# Patient Record
Sex: MALE | Race: BLACK OR AFRICAN AMERICAN | NOT HISPANIC OR LATINO | Employment: FULL TIME | URBAN - METROPOLITAN AREA
[De-identification: names, ages, dates, MRNs, and addresses within clinical notes are randomized per-mention and may not be internally consistent; named-entity substitution may affect disease eponyms.]

---

## 2023-07-10 NOTE — PROGRESS NOTES
"Subjective:       German Schmid is a 48 y.o. male who is a new patient who was  referred by Dr Froilan Dutta   for evaluation of "prostate check".      Referred for prostate cancer screening. PSA not sent with referral. No outside records available. He states he had a blood test recently but unsure if PSA was included. Denies nephrolithiasis, hematuria. H/o UTI 2-3 years ago. Denies LUTS. Denies family history of prostate cancer.     The following portions of the patient's history were reviewed and updated as appropriate: allergies, current medications, past family history, past medical history, past social history, past surgical history and problem list.    Review of Systems  Twelve point review of systems completed. Pertinent positive and negatives listed in HPI.      Objective:    Vitals: /80 (BP Location: Right arm, Patient Position: Sitting, BP Method: Large (Automatic))   Pulse 71   Ht 6' (1.829 m)   Wt 92.7 kg (204 lb 4.1 oz)   SpO2 97%   BMI 27.70 kg/m²     Physical Exam   General: well developed, well nourished in no acute distress  Head: normocephalic, atraumatic  Neck: supple, trachea midline, no obvious enlargement of thyroid  HEENT: EOMI, mucus membranes moist, sclera anicteric, no hearing impairment  Lungs: symmetric expansion, non-labored breathing  Neuro: alert and oriented x 3, no gross deficits  Psych: normal judgment and insight, normal mood/affect and non-anxious  Genitourinary:   Penis -  circumcised penis without plaques, lesions, or scarring.  Urethra - orthotopic location without stenosis.  Scrotum  - no lesions or rashes, no hydrocele or hernia.  Testes - descended bilaterally, symmetric without masses, non tender.  Epididymides - no cysts or lesions, no spermatocele, symmetric.   ALYSSA: Symmetric 40g prostate without nodularity or tenderness. Normal landmarks. seminal vesicles non palpable, normal sphincter tone without hemorrhoids or rectal masses. Normal appearance of anus and " perineum.      Lab Review   Urine analysis today in clinic shows +protein trace    No results found for: WBC, HGB, HCT, MCV, PLT  No results found for: CREATININE, BUN  No results found for: PSA  No results found for: PSADIAG    Imaging  NA     Assessment/Plan:      1. Prostate cancer screening    - He would like to proceed with PCa screening.    - PSA today   - ALYSSA normal   - Recommend annual checks if PSA normal       Follow up in 12 months with PSA

## 2023-07-19 ENCOUNTER — TELEPHONE (OUTPATIENT)
Dept: UROLOGY | Facility: CLINIC | Age: 48
End: 2023-07-19
Payer: COMMERCIAL

## 2023-07-19 ENCOUNTER — OFFICE VISIT (OUTPATIENT)
Dept: UROLOGY | Facility: CLINIC | Age: 48
End: 2023-07-19
Attending: UROLOGY
Payer: COMMERCIAL

## 2023-07-19 VITALS
DIASTOLIC BLOOD PRESSURE: 80 MMHG | SYSTOLIC BLOOD PRESSURE: 127 MMHG | OXYGEN SATURATION: 97 % | HEIGHT: 72 IN | WEIGHT: 204.25 LBS | BODY MASS INDEX: 27.67 KG/M2 | HEART RATE: 71 BPM

## 2023-07-19 DIAGNOSIS — Z12.5 PROSTATE CANCER SCREENING: Primary | ICD-10-CM

## 2023-07-19 PROCEDURE — 1159F MED LIST DOCD IN RCRD: CPT | Mod: CPTII,S$GLB,, | Performed by: UROLOGY

## 2023-07-19 PROCEDURE — 1160F PR REVIEW ALL MEDS BY PRESCRIBER/CLIN PHARMACIST DOCUMENTED: ICD-10-PCS | Mod: CPTII,S$GLB,, | Performed by: UROLOGY

## 2023-07-19 PROCEDURE — 3079F PR MOST RECENT DIASTOLIC BLOOD PRESSURE 80-89 MM HG: ICD-10-PCS | Mod: CPTII,S$GLB,, | Performed by: UROLOGY

## 2023-07-19 PROCEDURE — 3008F BODY MASS INDEX DOCD: CPT | Mod: CPTII,S$GLB,, | Performed by: UROLOGY

## 2023-07-19 PROCEDURE — 99202 OFFICE O/P NEW SF 15 MIN: CPT | Mod: S$GLB,,, | Performed by: UROLOGY

## 2023-07-19 PROCEDURE — 1159F PR MEDICATION LIST DOCUMENTED IN MEDICAL RECORD: ICD-10-PCS | Mod: CPTII,S$GLB,, | Performed by: UROLOGY

## 2023-07-19 PROCEDURE — 1160F RVW MEDS BY RX/DR IN RCRD: CPT | Mod: CPTII,S$GLB,, | Performed by: UROLOGY

## 2023-07-19 PROCEDURE — 3074F PR MOST RECENT SYSTOLIC BLOOD PRESSURE < 130 MM HG: ICD-10-PCS | Mod: CPTII,S$GLB,, | Performed by: UROLOGY

## 2023-07-19 PROCEDURE — 3074F SYST BP LT 130 MM HG: CPT | Mod: CPTII,S$GLB,, | Performed by: UROLOGY

## 2023-07-19 PROCEDURE — 3079F DIAST BP 80-89 MM HG: CPT | Mod: CPTII,S$GLB,, | Performed by: UROLOGY

## 2023-07-19 PROCEDURE — 3008F PR BODY MASS INDEX (BMI) DOCUMENTED: ICD-10-PCS | Mod: CPTII,S$GLB,, | Performed by: UROLOGY

## 2023-07-19 PROCEDURE — 99202 PR OFFICE/OUTPT VISIT, NEW, LEVL II, 15-29 MIN: ICD-10-PCS | Mod: S$GLB,,, | Performed by: UROLOGY

## 2023-07-19 NOTE — TELEPHONE ENCOUNTER
Pt notified of PSA results      ----- Message from Gardenia Noel MD sent at 7/19/2023  1:57 PM CDT -----  Please inform pt:  PSA looks good - 0.41. Recommend recheck in 1 year as planned.

## 2023-08-14 ENCOUNTER — TELEPHONE (OUTPATIENT)
Dept: TRANSPLANT | Facility: CLINIC | Age: 48
End: 2023-08-14
Payer: COMMERCIAL

## 2023-08-14 DIAGNOSIS — Z00.5 WILLING TO BE AN ORGAN DONOR: Primary | ICD-10-CM

## 2023-08-22 ENCOUNTER — LAB VISIT (OUTPATIENT)
Dept: LAB | Facility: HOSPITAL | Age: 48
End: 2023-08-22
Attending: NURSE PRACTITIONER
Payer: COMMERCIAL

## 2023-08-22 DIAGNOSIS — Z00.5 WILLING TO BE AN ORGAN DONOR: ICD-10-CM

## 2023-08-22 LAB — ABO + RH BLD: NORMAL

## 2023-08-22 PROCEDURE — 81373 HLA I TYPING 1 LOCUS LR: CPT | Mod: PN,TXP | Performed by: NURSE PRACTITIONER

## 2023-08-22 PROCEDURE — 81376 HLA II TYPING 1 LOCUS LR: CPT | Mod: 59,PN,TXP | Performed by: NURSE PRACTITIONER

## 2023-08-22 PROCEDURE — 81373 HLA I TYPING 1 LOCUS LR: CPT | Mod: 59,PN,TXP | Performed by: NURSE PRACTITIONER

## 2023-08-22 PROCEDURE — 81376 HLA II TYPING 1 LOCUS LR: CPT | Mod: PN,TXP | Performed by: NURSE PRACTITIONER

## 2023-08-22 PROCEDURE — 86901 BLOOD TYPING SEROLOGIC RH(D): CPT | Mod: TXP | Performed by: NURSE PRACTITIONER

## 2023-08-22 PROCEDURE — 36415 COLL VENOUS BLD VENIPUNCTURE: CPT | Mod: PO,TXP | Performed by: NURSE PRACTITIONER

## 2023-08-30 ENCOUNTER — TELEPHONE (OUTPATIENT)
Dept: TRANSPLANT | Facility: CLINIC | Age: 48
End: 2023-08-30
Payer: COMMERCIAL

## 2023-08-30 DIAGNOSIS — Z00.5 TRANSPLANT DONOR EVALUATION: Primary | ICD-10-CM

## 2023-08-30 NOTE — TELEPHONE ENCOUNTER
Patient notified that the results of the crossmatch with his friend show that they are compatible. Patient states he would like to proceed with the medical evaluation. Required testing was discussed including infectious disease and HIV testing. Opportunity provided for questions. Informed that he will be contacted to schedule appointments. All questions were answered and patient verbalized understanding.

## 2023-09-15 LAB — HLATY INTERPRETATION: NORMAL

## 2023-09-27 LAB
HLA DQA1 1: NORMAL
HLA DQA1 2: NORMAL
HLA DRB4 1: NORMAL
HLA-A 1 SERO. EQUIV: 3
HLA-A 1: NORMAL
HLA-A 2 SERO. EQUIV: 24
HLA-A 2: NORMAL
HLA-B 1 SERO. EQUIV: 7
HLA-B 1: NORMAL
HLA-B 2 SERO. EQUIV: NORMAL
HLA-B 2: NORMAL
HLA-BW 1 SERO. EQUIV: 6
HLA-BW 2 SERO. EQUIV: NORMAL
HLA-C 1: NORMAL
HLA-C 2: NORMAL
HLA-CW 1 SERO. EQUIV: 7
HLA-CW 2 SERO. EQUIV: NORMAL
HLA-DPA1 1: NORMAL
HLA-DPA1 2: NORMAL
HLA-DPB1 1: NORMAL
HLA-DPB1 2: NORMAL
HLA-DQ 1 SERO. EQUIV: 7
HLA-DQ 2 SERO. EQUIV: 6
HLA-DQB1 1: NORMAL
HLA-DQB1 2: NORMAL
HLA-DRB1 1 SERO. EQUIV: 11
HLA-DRB1 1: NORMAL
HLA-DRB1 2 SERO. EQUIV: 15
HLA-DRB1 2: NORMAL
HLA-DRB3 1: NORMAL
HLA-DRB3 2: NORMAL
HLA-DRB345 1 SERO. EQUIV: 52
HLA-DRB345 2 SERO. EQUIV: 51
HLA-DRB4 2: NORMAL
HLA-DRB5 1: NORMAL
HLA-DRB5 2: NORMAL
RTPCR TESTING DATE: NORMAL

## 2023-10-19 ENCOUNTER — TELEPHONE (OUTPATIENT)
Dept: TRANSPLANT | Facility: CLINIC | Age: 48
End: 2023-10-19
Payer: COMMERCIAL

## 2023-10-19 NOTE — TELEPHONE ENCOUNTER
Spoke with donor, confirmed appointments for 10/24/23.  24 hour urine collection instructions reviewed.  All questions answered.

## 2023-10-24 ENCOUNTER — HOSPITAL ENCOUNTER (OUTPATIENT)
Dept: RADIOLOGY | Facility: HOSPITAL | Age: 48
Discharge: HOME | End: 2023-10-24
Attending: TRANSPLANT SURGERY
Payer: COMMERCIAL

## 2023-10-24 ENCOUNTER — HOSPITAL ENCOUNTER (OUTPATIENT)
Dept: CARDIOLOGY | Facility: HOSPITAL | Age: 48
Discharge: HOME | End: 2023-10-24
Attending: NURSE PRACTITIONER
Payer: COMMERCIAL

## 2023-10-24 ENCOUNTER — OFFICE VISIT (OUTPATIENT)
Dept: TRANSPLANT | Facility: CLINIC | Age: 48
End: 2023-10-24
Payer: COMMERCIAL

## 2023-10-24 ENCOUNTER — OFFICE VISIT (OUTPATIENT)
Dept: PSYCHIATRY | Facility: CLINIC | Age: 48
End: 2023-10-24
Payer: COMMERCIAL

## 2023-10-24 ENCOUNTER — HOSPITAL ENCOUNTER (OUTPATIENT)
Dept: RADIOLOGY | Facility: HOSPITAL | Age: 48
Discharge: HOME | End: 2023-10-24
Attending: NURSE PRACTITIONER
Payer: COMMERCIAL

## 2023-10-24 VITALS — BODY MASS INDEX: 28.56 KG/M2 | HEIGHT: 71 IN | WEIGHT: 204 LBS

## 2023-10-24 VITALS
DIASTOLIC BLOOD PRESSURE: 84 MMHG | WEIGHT: 197.56 LBS | HEART RATE: 57 BPM | SYSTOLIC BLOOD PRESSURE: 133 MMHG | OXYGEN SATURATION: 99 % | HEIGHT: 71 IN | RESPIRATION RATE: 16 BRPM | TEMPERATURE: 97 F | BODY MASS INDEX: 27.66 KG/M2

## 2023-10-24 DIAGNOSIS — Z00.5 WILLING TO BE KIDNEY DONOR: Primary | ICD-10-CM

## 2023-10-24 DIAGNOSIS — Z00.5 TRANSPLANT DONOR EVALUATION: ICD-10-CM

## 2023-10-24 DIAGNOSIS — Z00.5 WILLING TO BE KIDNEY DONOR: ICD-10-CM

## 2023-10-24 DIAGNOSIS — Z01.818 PREOP EXAMINATION: Primary | ICD-10-CM

## 2023-10-24 LAB
ASCENDING AORTA: 3.08 CM
AV INDEX (PROSTH): 0.83
AV MEAN GRADIENT: 2 MMHG
AV PEAK GRADIENT: 5 MMHG
AV VALVE AREA BY VELOCITY RATIO: 3.62 CM²
AV VALVE AREA: 3.2 CM²
AV VELOCITY RATIO: 0.93
BSA FOR ECHO PROCEDURE: 2.15 M2
CV ECHO LV RWT: 0.39 CM
CV STRESS BASE HR: 53 BPM
DIASTOLIC BLOOD PRESSURE: 74 MMHG
DOP CALC AO PEAK VEL: 1.07 M/S
DOP CALC AO VTI: 25.59 CM
DOP CALC LVOT AREA: 3.9 CM2
DOP CALC LVOT DIAMETER: 2.22 CM
DOP CALC LVOT PEAK VEL: 1 M/S
DOP CALC LVOT STROKE VOLUME: 81.98 CM3
DOP CALCLVOT PEAK VEL VTI: 21.19 CM
E WAVE DECELERATION TIME: 213.53 MSEC
E/A RATIO: 0.97
E/E' RATIO: 6.32 M/S
ECHO LV POSTERIOR WALL: 0.93 CM (ref 0.6–1.1)
FRACTIONAL SHORTENING: 30 % (ref 28–44)
INTERVENTRICULAR SEPTUM: 0.95 CM (ref 0.6–1.1)
IVRT: 139.87 MSEC
LA MAJOR: 5.39 CM
LA MINOR: 4.98 CM
LA WIDTH: 3.35 CM
LEFT ATRIUM SIZE: 2.86 CM
LEFT ATRIUM VOLUME INDEX MOD: 24.9 ML/M2
LEFT ATRIUM VOLUME INDEX: 19.8 ML/M2
LEFT ATRIUM VOLUME MOD: 53 CM3
LEFT ATRIUM VOLUME: 42.16 CM3
LEFT INTERNAL DIMENSION IN SYSTOLE: 3.32 CM (ref 2.1–4)
LEFT VENTRICLE DIASTOLIC VOLUME INDEX: 49.04 ML/M2
LEFT VENTRICLE DIASTOLIC VOLUME: 104.45 ML
LEFT VENTRICLE MASS INDEX: 72 G/M2
LEFT VENTRICLE SYSTOLIC VOLUME INDEX: 21.1 ML/M2
LEFT VENTRICLE SYSTOLIC VOLUME: 44.9 ML
LEFT VENTRICULAR INTERNAL DIMENSION IN DIASTOLE: 4.74 CM (ref 3.5–6)
LEFT VENTRICULAR MASS: 153.37 G
LV LATERAL E/E' RATIO: 5.45 M/S
LV SEPTAL E/E' RATIO: 7.5 M/S
MV A" WAVE DURATION": 7.71 MSEC
MV PEAK A VEL: 0.62 M/S
MV PEAK E VEL: 0.6 M/S
MV STENOSIS PRESSURE HALF TIME: 61.92 MS
MV VALVE AREA P 1/2 METHOD: 3.55 CM2
OHS CV CPX 1 MINUTE RECOVERY HEART RATE: 108 BPM
OHS CV CPX 85 PERCENT MAX PREDICTED HEART RATE MALE: 145
OHS CV CPX ESTIMATED METS: 15
OHS CV CPX MAX PREDICTED HEART RATE: 170
OHS CV CPX PATIENT IS FEMALE: 0
OHS CV CPX PATIENT IS MALE: 1
OHS CV CPX PEAK DIASTOLIC BLOOD PRESSURE: 85 MMHG
OHS CV CPX PEAK HEAR RATE: 160 BPM
OHS CV CPX PEAK RATE PRESSURE PRODUCT: NORMAL
OHS CV CPX PEAK SYSTOLIC BLOOD PRESSURE: 218 MMHG
OHS CV CPX PERCENT MAX PREDICTED HEART RATE ACHIEVED: 94
OHS CV CPX RATE PRESSURE PRODUCT PRESENTING: 7261
PISA TR MAX VEL: 2.41 M/S
PULM VEIN S/D RATIO: 1.31
PV PEAK D VEL: 0.36 M/S
PV PEAK S VEL: 0.47 M/S
RA MAJOR: 5.5 CM
RA PRESSURE ESTIMATED: 3 MMHG
RA WIDTH: 4.29 CM
RIGHT VENTRICULAR END-DIASTOLIC DIMENSION: 3.7 CM
RV TB RVSP: 5 MMHG
SINUS: 3.7 CM
STJ: 2.87 CM
STRESS ECHO POST EXERCISE DUR MIN: 9 MINUTES
STRESS ECHO POST EXERCISE DUR SEC: 0 SECONDS
SYSTOLIC BLOOD PRESSURE: 137 MMHG
TDI LATERAL: 0.11 M/S
TDI SEPTAL: 0.08 M/S
TDI: 0.1 M/S
TR MAX PG: 23 MMHG
TRICUSPID ANNULAR PLANE SYSTOLIC EXCURSION: 2.17 CM
TV REST PULMONARY ARTERY PRESSURE: 26 MMHG
Z-SCORE OF LEFT VENTRICULAR DIMENSION IN END DIASTOLE: -3.59
Z-SCORE OF LEFT VENTRICULAR DIMENSION IN END SYSTOLE: -1.75

## 2023-10-24 PROCEDURE — 99205 OFFICE O/P NEW HI 60 MIN: CPT | Mod: S$GLB,TXP,, | Performed by: NURSE PRACTITIONER

## 2023-10-24 PROCEDURE — 96131 PSYCL TST EVAL PHYS/QHP EA: CPT | Mod: S$GLB,TXP,, | Performed by: PSYCHOLOGIST

## 2023-10-24 PROCEDURE — 74174 CTA ABDOMEN AND PELVIS: ICD-10-PCS | Mod: 26,TXP,, | Performed by: RADIOLOGY

## 2023-10-24 PROCEDURE — 3044F HG A1C LEVEL LT 7.0%: CPT | Mod: CPTII,S$GLB,TXP, | Performed by: NURSE PRACTITIONER

## 2023-10-24 PROCEDURE — 97802 MEDICAL NUTRITION INDIV IN: CPT | Mod: S$GLB,TXP,,

## 2023-10-24 PROCEDURE — 99204 OFFICE O/P NEW MOD 45 MIN: CPT | Mod: S$GLB,TXP,, | Performed by: TRANSPLANT SURGERY

## 2023-10-24 PROCEDURE — 74174 CTA ABD&PLVS W/CONTRAST: CPT | Mod: TC,TXP

## 2023-10-24 PROCEDURE — 96139 PR PSYCH/NEUROPSYCH TEST ADMIN/SCORING, BY TECH, 2+ TESTS, EA ADDTL 30 MIN: ICD-10-PCS | Mod: S$GLB,TXP,, | Performed by: PSYCHOLOGIST

## 2023-10-24 PROCEDURE — 1159F PR MEDICATION LIST DOCUMENTED IN MEDICAL RECORD: ICD-10-PCS | Mod: CPTII,S$GLB,TXP, | Performed by: NURSE PRACTITIONER

## 2023-10-24 PROCEDURE — 99999 PR PBB SHADOW E&M-EST. PATIENT-LVL IV: CPT | Mod: PBBFAC,TXP,,

## 2023-10-24 PROCEDURE — 93320 DOPPLER ECHO COMPLETE: CPT | Mod: 26,TXP,, | Performed by: INTERNAL MEDICINE

## 2023-10-24 PROCEDURE — 97802 PR MED NUTR THER, 1ST, INDIV, EA 15 MIN: ICD-10-PCS | Mod: S$GLB,TXP,,

## 2023-10-24 PROCEDURE — 3008F PR BODY MASS INDEX (BMI) DOCUMENTED: ICD-10-PCS | Mod: CPTII,S$GLB,TXP, | Performed by: NURSE PRACTITIONER

## 2023-10-24 PROCEDURE — 99204 PR OFFICE/OUTPT VISIT, NEW, LEVL IV, 45-59 MIN: ICD-10-PCS | Mod: S$GLB,TXP,, | Performed by: TRANSPLANT SURGERY

## 2023-10-24 PROCEDURE — 1159F MED LIST DOCD IN RCRD: CPT | Mod: CPTII,S$GLB,TXP, | Performed by: NURSE PRACTITIONER

## 2023-10-24 PROCEDURE — 93351 STRESS TTE COMPLETE: CPT | Mod: 26,TXP,, | Performed by: INTERNAL MEDICINE

## 2023-10-24 PROCEDURE — 1160F RVW MEDS BY RX/DR IN RCRD: CPT | Mod: CPTII,S$GLB,TXP, | Performed by: NURSE PRACTITIONER

## 2023-10-24 PROCEDURE — 96138 PR PSYCH/NEUROPSYCH TEST ADMIN/SCORING, BY TECH, 2+ TESTS, 1ST 30 MIN: ICD-10-PCS | Mod: S$GLB,TXP,, | Performed by: PSYCHOLOGIST

## 2023-10-24 PROCEDURE — 71046 XR CHEST PA AND LATERAL: ICD-10-PCS | Mod: 26,TXP,, | Performed by: RADIOLOGY

## 2023-10-24 PROCEDURE — 99999 PR PBB SHADOW E&M-EST. PATIENT-LVL IV: ICD-10-PCS | Mod: PBBFAC,TXP,,

## 2023-10-24 PROCEDURE — 96138 PSYCL/NRPSYC TECH 1ST: CPT | Mod: S$GLB,TXP,, | Performed by: PSYCHOLOGIST

## 2023-10-24 PROCEDURE — 3075F SYST BP GE 130 - 139MM HG: CPT | Mod: CPTII,S$GLB,TXP, | Performed by: NURSE PRACTITIONER

## 2023-10-24 PROCEDURE — 90791 PR PSYCHIATRIC DIAGNOSTIC EVALUATION: ICD-10-PCS | Mod: S$GLB,TXP,, | Performed by: PSYCHOLOGIST

## 2023-10-24 PROCEDURE — 96130 PSYCL TST EVAL PHYS/QHP 1ST: CPT | Mod: S$GLB,TXP,, | Performed by: PSYCHOLOGIST

## 2023-10-24 PROCEDURE — 3075F PR MOST RECENT SYSTOLIC BLOOD PRESS GE 130-139MM HG: ICD-10-PCS | Mod: CPTII,S$GLB,TXP, | Performed by: NURSE PRACTITIONER

## 2023-10-24 PROCEDURE — 93351 STRESS TTE COMPLETE: CPT | Mod: TXP

## 2023-10-24 PROCEDURE — 93320 STRESS ECHO (CUPID ONLY): ICD-10-PCS | Mod: 26,TXP,, | Performed by: INTERNAL MEDICINE

## 2023-10-24 PROCEDURE — 3044F PR MOST RECENT HEMOGLOBIN A1C LEVEL <7.0%: ICD-10-PCS | Mod: CPTII,S$GLB,TXP, | Performed by: PSYCHOLOGIST

## 2023-10-24 PROCEDURE — 90791 PSYCH DIAGNOSTIC EVALUATION: CPT | Mod: S$GLB,TXP,, | Performed by: PSYCHOLOGIST

## 2023-10-24 PROCEDURE — 3044F HG A1C LEVEL LT 7.0%: CPT | Mod: CPTII,S$GLB,TXP, | Performed by: PSYCHOLOGIST

## 2023-10-24 PROCEDURE — 96130 PR PSYCHOLOGIC TEST EVAL SVCS, 1ST HR: ICD-10-PCS | Mod: S$GLB,TXP,, | Performed by: PSYCHOLOGIST

## 2023-10-24 PROCEDURE — 93351 STRESS ECHO (CUPID ONLY): ICD-10-PCS | Mod: 26,TXP,, | Performed by: INTERNAL MEDICINE

## 2023-10-24 PROCEDURE — 74174 CTA ABD&PLVS W/CONTRAST: CPT | Mod: 26,TXP,, | Performed by: RADIOLOGY

## 2023-10-24 PROCEDURE — 3079F DIAST BP 80-89 MM HG: CPT | Mod: CPTII,S$GLB,TXP, | Performed by: NURSE PRACTITIONER

## 2023-10-24 PROCEDURE — 96139 PSYCL/NRPSYC TST TECH EA: CPT | Mod: S$GLB,TXP,, | Performed by: PSYCHOLOGIST

## 2023-10-24 PROCEDURE — 99205 PR OFFICE/OUTPT VISIT, NEW, LEVL V, 60-74 MIN: ICD-10-PCS | Mod: S$GLB,TXP,, | Performed by: NURSE PRACTITIONER

## 2023-10-24 PROCEDURE — 93325 STRESS ECHO (CUPID ONLY): ICD-10-PCS | Mod: 26,TXP,, | Performed by: INTERNAL MEDICINE

## 2023-10-24 PROCEDURE — 1160F PR REVIEW ALL MEDS BY PRESCRIBER/CLIN PHARMACIST DOCUMENTED: ICD-10-PCS | Mod: CPTII,S$GLB,TXP, | Performed by: NURSE PRACTITIONER

## 2023-10-24 PROCEDURE — 3079F PR MOST RECENT DIASTOLIC BLOOD PRESSURE 80-89 MM HG: ICD-10-PCS | Mod: CPTII,S$GLB,TXP, | Performed by: NURSE PRACTITIONER

## 2023-10-24 PROCEDURE — 71046 X-RAY EXAM CHEST 2 VIEWS: CPT | Mod: 26,TXP,, | Performed by: RADIOLOGY

## 2023-10-24 PROCEDURE — 96131 PR PSYCHOLOGIC TEST EVAL SVCS, EA ADDTL HR: ICD-10-PCS | Mod: S$GLB,TXP,, | Performed by: PSYCHOLOGIST

## 2023-10-24 PROCEDURE — 93325 DOPPLER ECHO COLOR FLOW MAPG: CPT | Mod: 26,TXP,, | Performed by: INTERNAL MEDICINE

## 2023-10-24 PROCEDURE — 71046 X-RAY EXAM CHEST 2 VIEWS: CPT | Mod: TC,TXP

## 2023-10-24 PROCEDURE — 25500020 PHARM REV CODE 255: Mod: TXP | Performed by: TRANSPLANT SURGERY

## 2023-10-24 PROCEDURE — 3044F PR MOST RECENT HEMOGLOBIN A1C LEVEL <7.0%: ICD-10-PCS | Mod: CPTII,S$GLB,TXP, | Performed by: NURSE PRACTITIONER

## 2023-10-24 PROCEDURE — 3008F BODY MASS INDEX DOCD: CPT | Mod: CPTII,S$GLB,TXP, | Performed by: NURSE PRACTITIONER

## 2023-10-24 RX ADMIN — IOHEXOL 100 ML: 350 INJECTION, SOLUTION INTRAVENOUS at 04:10

## 2023-10-24 NOTE — PROGRESS NOTES
"DONOR TEACHING NOTE    Met with German Schmid today in clinic to review living donor education.        Topics covered included:  Kidney donation is done voluntarily and of the donor's free will.  Process can stop at any time.   Better success rates than cadaveric donation, shorter waiting time for the recipient: less than the 3-5 year wait on the list, more time to prepare: tests/surgery can be planned  Laboratory studies of blood and urine.  Health exams: records of GYN/pap/mammogram (females); evaluation by transplant team and a psychiatrist (if indicated); diagnostic Tests: CXR, EKG, TB skin test, 24-hour urine collection, renal scan, CT of abdomen to assess kidney anatomy, and stress test (if indicated)  Team will review workup for approval.  Copy of the approved criteria for donation given to patient.  Surgeon will decide which kidney will be donated.   Benefits of laparoscopic nephrectomy: less pain, shorter hospital stay, a shorter recovery period.  Risks discussed: bleeding, deep vein thrombosis, pulmonary embolus, the need for re-operation.  Your operation may be converted to an "open" procedure if the surgeon feels it is medically necessary.  To recovery room, transfer to TSU, if space allows or semi-private room.  Salvador catheter/IV, average hospital stay is 1 day.  At discharge the cost of any prescriptions is the donor's responsibility.  Post-operative visit 4 weeks after surgery or prior to returning to work, unless a complication arises and you need to be seen sooner.  Off of work for about 3 to 6 weeks, no driving for at least the first 3 weeks.  General surgical complications: infection, allergic reaction, and anesthesia.   Long life considerations of living with one kidney: risk of HTN and kidney failure   Following up with PCP 6 months after donation then yearly thereafter to monitor kidney function.  This should include weight, labs, urinalysis, and a blood pressure check.  We are required to " report your progress to [facility] at 6 months, 1 year, and 2 years post-donation.     Written educational material provided. Informed consent reviewed and signed. All questions were answered and patient verbalized understanding.     Patient is a suitable candidate for living kidney donation.

## 2023-10-24 NOTE — Clinical Note
This evaluation revealed no contraindications to kidney donation from the psychological perspective.  No recommendations are forthcoming.

## 2023-10-24 NOTE — PROGRESS NOTES
Kidney Transplant Donor Evaluation    Subjective:       CC:  Initial evaluation of kidney donor candidacy.    HPI:  Mr. Schmid is a 48 y.o. year old Black or  male who has presented to be evaluated as a potential living unrelated donor for his friend.  Mr. Schmid reports being here without coercion, payment, guilt or other alternative motives other than wanting to help someone with kidney disease.    Born to term, no significant childhood illnesses.    Has no diagnosed medical conditions, takes no prescribed medications, has never had any surgeries.     Brother and maternal grandmother with HTN, no family history of DM.    He works as a , very active daily. Looks great, not frail. Takes OTC potassium supplements PRN for cramping.    Patient denies any history of coronary artery disease, stroke, seizure disorder, chronic obstructive pulmonary disease, liver disease, kidney stones, gallstones, deep venous thrombosis, pulmonary embolism, recurrent urinary tract infections or malignancies.    No current outpatient medications on file.     No current facility-administered medications for this visit.     Family History   Problem Relation Age of Onset    Cancer Mother         breast    Hypertension Brother     Hypertension Maternal Grandmother      History reviewed. No pertinent past medical history.  History reviewed. No pertinent surgical history.  Social History     Tobacco Use    Smoking status: Former     Types: Cigarettes    Tobacco comments:     QUIT SMOKING 2011   Substance Use Topics    Alcohol use: Yes     Comment: 6 drinks/week    Drug use: Yes     Types: Marijuana       Review of Systems   Constitutional:  Negative for activity change, appetite change and fever.   HENT:  Negative for congestion, mouth sores and sore throat.    Eyes:  Negative for visual disturbance.   Respiratory:  Negative for cough, chest tightness and shortness of breath.    Cardiovascular:  Negative for chest  "pain, palpitations and leg swelling.   Gastrointestinal:  Negative for abdominal distention, abdominal pain, constipation, diarrhea and nausea.   Genitourinary:  Negative for difficulty urinating, frequency and hematuria.   Musculoskeletal:  Negative for arthralgias and gait problem.   Skin:  Negative for wound.   Allergic/Immunologic: Negative for environmental allergies, food allergies and immunocompromised state.   Neurological:  Negative for dizziness, weakness and numbness.   Psychiatric/Behavioral:  Negative for sleep disturbance. The patient is not nervous/anxious.        Objective:  /84 (BP Location: Right arm, Patient Position: Sitting, BP Method: Medium (Automatic))   Pulse (!) 57   Temp 97.2 °F (36.2 °C) (Temporal)   Resp 16   Ht 5' 11.14" (1.807 m)   Wt 89.6 kg (197 lb 8.5 oz)   SpO2 99%   BMI 27.44 kg/m²      Physical Exam  Vitals and nursing note reviewed.   Constitutional:       Appearance: Normal appearance.   HENT:      Head: Normocephalic.   Cardiovascular:      Rate and Rhythm: Normal rate and regular rhythm.      Heart sounds: Normal heart sounds.   Pulmonary:      Effort: Pulmonary effort is normal.      Breath sounds: Normal breath sounds.   Abdominal:      General: Bowel sounds are normal. There is no distension.      Palpations: Abdomen is soft.      Tenderness: There is no abdominal tenderness.   Musculoskeletal:         General: Normal range of motion.   Skin:     General: Skin is warm and dry.   Neurological:      General: No focal deficit present.      Mental Status: He is alert.   Psychiatric:         Behavior: Behavior normal.         Labs:  Lab Results   Component Value Date    WBC 4.23 10/24/2023    HGB 13.4 (L) 10/24/2023    HCT 41.3 10/24/2023    MCV 96 10/24/2023     10/24/2023      Latest Reference Range & Units 10/24/23   Sodium 136 - 145 mmol/L 140   Potassium 3.5 - 5.1 mmol/L 4.0   Chloride 95 - 110 mmol/L 104   CO2 23 - 29 mmol/L 26   Anion Gap 8 - 16 " mmol/L 10   BUN 6 - 20 mg/dL 18   Creatinine 0.5 - 1.4 mg/dL 1.3   eGFR >60 mL/min/1.73 m^2 >60.0   Glucose 70 - 110 mg/dL 96   Calcium 8.7 - 10.5 mg/dL 9.5   Phosphorus Level 2.7 - 4.5 mg/dL 3.8   PROTEIN TOTAL 6.0 - 8.4 g/dL 8.1   Albumin 3.5 - 5.2 g/dL 4.3   BILIRUBIN TOTAL 0.1 - 1.0 mg/dL 0.5 [1]   AST 10 - 40 U/L 23   ALT 10 - 44 U/L 25   Prostate Specific Antigen 0.00 - 4.00 ng/mL 0.43 [2]   [   Latest Reference Range & Units 10/24/23   Hemoglobin A1C External 4.0 - 5.6 % 4.8 [1]     10/24/2023: Creatinine 1.3 mg/dL (Ref range: 0.5 - 1.4 mg/dL); BUN 18 mg/dL (Ref range: 6 - 20 mg/dL)     ABO type: A POS    Assessment:     1. Willing to be kidney donor    2. BMI 27.0-27.9,adult      Plan:   Donor Candidacy:   Based on the given information, Mr. Schmid appears to be a suitable candidate for kidney donation.  A final recommendation will be made by the selection committee after reviewing his complete workup.    Rhonda Clancy NP       Counseling:   I discussed with Mr. Schmid that donation is voluntary and reiterated it should be done willingly and for altruistic reasons only.  I reviewed that no payment should be received for donating.  I also discussed that coercion, guilt, pressure, or feelings of obligation are not appropriate reasons to donate.  The option to withdraw at any time was emphasized.  Mr. Schmid was reminded that a medical opt out can be given to protect his confidentiality, and no member of the transplant team will discuss specifics of his health or medical/social history with anyone else without permission.  The need for lifelong routine medical follow-up for optimal health, including routine health maintenance was reviewed.    We also discussed the long term risks associated with kidney donation.  I told the patient that his GFR should return to within 75-80% of pre-donation level within six months of donation.  I informed the patient that there is a small risk of developing albuminuria  and hypertension following donor nephrectomy.  I also informed the patient that based on current literature, the risk of developing end-stage renal disease following donor nephrectomy is similar to the general population.    Patient advised that it is recommended that all transplant candidates, and their close contacts and household members receive Covid vaccination.    I reviewed with Mr. Schmid available lab results and other diagnostics from the evaluation process    Additional Counseling:   The patient was counseled on the need for regular follow-up with a primary care physician for blood pressure and cholesterol screening.  The importance of age appropriate health screening was also emphasized.    Follow-up: PRN    Altogether, 30 minutes of this encounter were spent on counseling, which was greater than 50% of the total visit time.

## 2023-10-24 NOTE — PROGRESS NOTES
TRANSPLANT DONOR PSYCHOSOCIAL ASSESSMENT    German Schmid  [address]  [place] [address]  Telephone Information:   Mobile [phone]     Home [phone] (home)  Work  There is no work phone number on file.  E-mail  [email or web address]    PHS Increased Risk Behavioral Questionnaire reviewed by : Yes   addressed any PHS increased risk behaviors or concerns. Resources and education provided as appropriate.    Sex: male  YOB: 1975  Age: 48 y.o.    Encounter Date: 10/24/2023  [place] Citizen: yes  Primary Language: English   Needed: no    Potential Recipient: Ayush Samuels  Clinic Number: [id number]  Donor's Relationship to Patient: Recipeint is donor's best friend since     Emergency Contact:  Dior Gonsalves, 54 yo sister, JAMES Blanco, does drive/own car, works remotely at [employer]. [phone]    Family/Social Support:   Number of dependents/: pt denies  Marital history: Pt reports is single at this time.  Other family dynamics: Pt reports supportive family system. Pt reports sister Dior Gonsalves and brother Candido Schmid will be transplant caregivers. Pt reports 1 child, Barbara Maloney, lives in [facility] and most likely will not be assisting with transplant.  Pt's daughter: Barbara Maloney, 35 yo daughter, [facility], does drive/own car, employed at [employer]. [phone]    Household Composition:  Pt reports lives alone in [facility]    Do you and your caregivers have access to reliable transportation? yes  PRIMARY CAREGIVER: Dior Gonsalves, sister, will be primary caregiver, phone number [phone]      provided in-depth information to patient and caregiver regarding pre- and post-transplant caregiver role.   strongly encourages patient and caregiver to have concrete plan regarding post-transplant care giving, including back-up caregiver(s) to ensure care giving needs are met as  needed.    Patient and Caregiver states understanding all aspects of caregiver role/commitment and is able/willing/committed to being caregiver to the fullest extent necessary.    Patient and Caregiver verbalizes understanding of the education provided today and caregiver responsibilities.         remains available. Patient and Caregiver agree to contact  in a timely manner if concerns arise.      Able to take time off work without financial concerns: yes.     Additional Significant Others who will Assist with Transplant:  Candido Schmid, 39 yo brother, JAMES Blanco, does drive/own car, employed . [phone]    Living Will: no  Healthcare Power of : no  Advance Directives on file: <no information> per medical record.  Verbally reviewed LW/HCPA information.   provided patient with copy of LW/HCPA documents and provided education on completion of forms.    Education: high school  Reading Ability: 12th grade  Reports difficulty with: N/A Pt denies any problem learning new information.  Learns [employer]:  multisensory     Status: no  VA Benefits: no     Employment:  Works full time at [employer] as a  and  for 4 years. Pt reports does have STD, life insurance and medical insurance through work. Pt reports plan to learn more details about STD so he can plan financially for transplant time away from work.    Annual Income:  pt reports earns $40,000 to 50,000 per year     Fundraising and NLDAC information provided to patient.  Patient and Caregiver verbalizes understanding.   remains available.    Spouse/Significant Other Employment: Pt reports is single, not .    Insurance: see potential recipient's insurance for donor coverage.    Does the donor have health insurance? Pt reports having [employer] medical insurance with [employer] through work.    Patient verbalizes clear understanding that patient may  experience difficulty obtaining and/or be denied insurance coverages post-surgery.  This includes and is not limited to disability insurance, life insurance, health insurance, burial insurance, long term care insurance, and other insurances.  Patient also reports understanding that future health concerns related to or unrelated to transplantation may not be covered by patient's insurance.  Resources and information provided and reviewed.      Patient provides verbal permission to release any necessary information to outside resources for patient care and discharge planning.  Resources and information provided and reviewed.      Infusion Service: patient utilizing? no  Home Health: patient utilizing? no  DME: yes CPAP  ADLS:  independent with all activities    Adherence:   Pt reports high medical compliance with appointments and instructions within last 3 months.   Adherence education and counseling provided    Per History Section:  History reviewed. No pertinent past medical history.  Social History     Tobacco Use    Smoking status: Former     Types: Cigarettes    Smokeless tobacco: Not on file    Tobacco comments:     QUIT SMOKING 2011   Substance Use Topics    Alcohol use: Yes     Comment: 6 drinks/week     Social History     Substance and Sexual Activity   Drug Use Yes    Types: Marijuana     Social History     Substance and Sexual Activity   Sexual Activity Yes       Per Today's Psychosocial:  Tobacco: none, patient denies any use at this time. Pt reports former smoker and quit on own 2011.  Alcohol: yes, drinks about 6 alcohol drinks per week.  Illicit Drugs/Non-prescribed Medications: pt reports smokes marijuana daily. Pt reports utilizes for sleep. Pt reports can and will discontinue marijuana use 8 weeks prior to donor surgery.     Patient states clear understanding of the potential impact of substance use as it relates to donor candidacy and is agreeable to random substance screening.  Substance  abstinence/cessation counseling, education and resources provided and reviewed.     Arrests/DWI/Treatment/Rehab: patient denies    Psychiatric History:    Mental Health: Pt denies any mental health history or current mental health concerns at this time. Pt denies any need for mental health referral at this time.  Psychiatrist/Counselor: pt denies  Medications:  pt denies  Suicide/Homicide Issues: pt denies any si/hi history   Safety at home: pt reports living in safe home environment with no abuse.    Donation Knowledge/Expectations: Patient states having clear understanding and realistic expectations regarding the potential risks and potential benefits of organ transplantation and organ donation and agrees to discuss with health care team members and support system members, as well as to utilize available resources and express questions and/or concerns in order to further facilitate the patients informed decision-making.  Resources and information provided and reviewed.     Decision-making Process: Pt reports it was his idea to be evaluated to be his friend Ayush Samuels's living kidney donor. Pt reports hopes for successful kidney transplant surgery so friend can discontinue dialysis and have healthier life. Patient states understanding that transplant and donation are not a guarantee that the donated organ will function. Patient states understanding of kidney treatment options available for kidney patients, psychosocial aspects surrounding organ donation and transplantation as well as recovery.  Patient also states clear understanding that patient may choose to not donate at any time prior to surgery taking place, and that patient confidentiality is protected.  Patient reports expected compliance with health care regime and states understanding of importance of compliance.  Educational information provided.    Patient reports having a clear understanding  that risks and benefits may be involved with organ  transplantation and with organ donation and  of the treatment options available to a potential transplant recipient. Patient has an understanding there are short and long-term medical and psychosocial risks of living donation for both the donor and recipient. Patient reports clear understanding that psychosocial risk factors which may affect patient, including but not limited to feelings of depression, generalized anxiety, anxiety regarding dependence on others, post traumatic stress disorder, feelings of guilt and other emotional and/or mental concerns, and/or exacerbation of existing mental health concerns.     Detailed resources and education provided and discussed. Patient agrees to access appropriate resources in a timely manner as needed and to communicate concerns appropriately.      Feelings or Concerns: Patient denies feelings of coercion, pressure or obligation to donate. Patient states that patient is not receiving any compensation for organ donation. Patient reports motivation to pursue organ donation at this time.     Patient reports having clear and realistic expectations and understanding of the many psychosocial aspects involved with being a living organ donor, including but not limited to costs, compliance, medications, lab work, procedures, appointments, financial planning, preparedness, timely and appropriate communication of concerns, abstinence from non-prescribed drugs or substances, importance of adherence to and follow-through with all health care team recommendations, participation in health care and treatment planning, utilization of resources and follow-through, mental health counseling as needed and/or recommended, and the patient and caregiver responsibilities.  Patient states having a clear understanding of possible difficulty obtaining or possibility of being denied insurance coverage post-surgery.  This includes and is not limited to disability insurance, life insurance, health  insurance, burial insurance, long-term care insurance and other insurances.  Educational and resource information provided and reviewed.  Patient also reports understanding that future health concerns related to or unrelated to organ donation may not be covered by patients insurance.    Coping: Identify Patient & Caregiver Strategies to [place]:   1. In the past, coping with major surgery and/or related stress - family support; exercises a lot to do his job; enjoys bowling 2 x week.   2. Currently & Pre-transplant - family support   3. At the time of organ donation surgery - family support   4. During post-Organ donation & Recovery Period - family support    Interview Behavior: German presents as alert and oriented x 4, pleasant, good eye contact, well groomed, recall good, concentration/judgement good, average intelligence, calm, communicative, cooperative, and asking and answering questions appropriately.  Pt's sister Dior Gonsalves in session for caregiver/transportation confirmation only. Otherwise, patient was seen alone.    Suitability for Donation: German Schmid presents as low risk candidate for donation at this time.    Recommendations/Additional Comments: Pt reports having STD through work and reports plan to contact HR about STD details so he can plan for time away from work. NLDAC was reviewed. Pt reports utilizes marijuana daily. Pt reports plan to discontinue marijuana use 8 weeks prior to donor surgery.    SW recommends that pt conduct fundraising to assist pt with pay for cost of medications, food, gas, and other transplant related needs.  SW recommends that pt remain aware of potential mental health concerns and contact the team if any concerns arise.  SW recommends that pt remain abstinent from tobacco, ETOH, and drug use.  SW supports pt's continued adherence. SW remains available to answer any questions or concerns that arise as the pt moves through the transplant process.     Final determination of  transplant candidacy will be reviewed by the selection committee.       Gudelia Klein MSW [facility]

## 2023-10-24 NOTE — PROGRESS NOTES
REASON FOR VISIT:   Potential Kidney Donor; elevated cholesterol     PAST MEDICAL HX:   No significant past medical hx     LABS:   Cholesterol 226    WT: 89.6 kg (197 lb)  BMI: 27.44 kg/m2      NUTRITION HX:   Pt and sister present. Pt reports good appetite with no N/V/D/C.  lb, stable. Functional in ADLs; bowling league 2x/wk. Pt reports recently eating take out regularly 2/2 girlfriend normally cooks at home and she is currently out of town. Pt attends annual PCP appointments with up and down cholesterol levels.     INTERVENTION/EDUCATION:  Reviewed Fat Restricted Nutrition Therapy handout (general information, foods recommended, foods not recommended, sample menus).    Encouraged physical activity daily, regular exercise as tolerated, stay mobile.    Patient voiced understanding of education & goals. Contact information was provided & will f/u as needed at clinic visits.     Consultation Time: 15 minutes

## 2023-10-24 NOTE — PROGRESS NOTES
PHARM.D. PRE-TRANSPLANT DONOR NOTE:    This patient's medication therapy was evaluated as part of his pre-transplant DONOR evaluation.      The following general pharmacologic concerns were noted: none    The following concerns for post-operative pain management were noted: none        No current outpatient medications on file.     No current facility-administered medications for this visit.           I am available for consultation and can be contacted, as needed by the other members of the Transplant team.

## 2023-10-24 NOTE — PROGRESS NOTES
Transplant Surgery Kidney Donor Evaluation     Referring Physician:     Subjective:     Chief Complaint: German Schmid is a 48 y.o. year old male who presents today wishing to be evaluated as a potential living unrelated donor for his friend.    History of Present Illness:  German reports being here without coercion, payment, guilt, or other alternative motives other than wanting to help someone with kidney disease.    External provider notes reviewed: Yes    Review of Systems   Constitutional:  Negative for activity change and appetite change.   HENT:  Negative for congestion and tinnitus.    Eyes:  Negative for redness and visual disturbance.   Respiratory:  Negative for cough and shortness of breath.    Cardiovascular:  Negative for chest pain and palpitations.   Gastrointestinal:  Negative for abdominal distention and abdominal pain.   Endocrine: Negative for polydipsia and polyuria.   Genitourinary:  Negative for decreased urine volume and dysuria.   Musculoskeletal:  Negative for arthralgias and back pain.   Skin:  Negative for pallor and rash.   Allergic/Immunologic: Negative for environmental allergies and immunocompromised state.   Neurological:  Negative for tremors and headaches.   Hematological:  Negative for adenopathy. Does not bruise/bleed easily.   Psychiatric/Behavioral:  Negative for behavioral problems and confusion.      Objective:   Physical Exam  Constitutional:       General: He is not in acute distress.     Appearance: He is not diaphoretic.   HENT:      Head: Normocephalic and atraumatic.   Eyes:      Conjunctiva/sclera: Conjunctivae normal.      Pupils: Pupils are equal, round, and reactive to light.   Cardiovascular:      Rate and Rhythm: Normal rate and regular rhythm.   Pulmonary:      Effort: Pulmonary effort is normal.      Breath sounds: Normal breath sounds.   Abdominal:      General: Bowel sounds are normal. There is no distension.      Palpations: Abdomen is soft.      Tenderness:  There is no abdominal tenderness.   Musculoskeletal:         General: Normal range of motion.      Cervical back: Normal range of motion and neck supple.   Skin:     General: Skin is warm and dry.   Neurological:      Mental Status: He is alert and oriented to person, place, and time.   Psychiatric:         Behavior: Behavior normal.       ABO type: A POS    Diagnostics:  The following labs have been reviewed: CBC  CMP  The following radiology images have been independently reviewed and interpreted:     Diagnoses:  No diagnosis found.         Transplant Surgery - Candidacy   Assessment/Plan:     Donor Candidacy: Based on information available thus far, German is a suitable candidate for living kidney donation.    Additional testing to be completed according to Written Order Guideline for Living Kidney Donor (LD) Evaluation (LDK-02).    Patient advised that it is recommended that all transplant candidates, and their close contacts and household members receive Covid vaccination.    Interpretation of tests and discussion of patient management involves all members of the multidisciplinary transplant team.  Chilo Mathew MD       Education: I discussed with the patient the requirements for donation including the compatibility of blood and tissue typing, healthy by physical examination and workup, as well as the desire to donate.  We discussed the risks related to surgery including the risks related to anesthesia, bleeding, infection, inability for surgery to be performed laparoscopically, risks of reoperation as well as the risks of death.  We discussed the length of hospitalization, return to work times, as well as follow-up post-donation.    I also discussed the slight possibility that due to problems with the recipient operation, the transplant might not be able to be completed after the organ was already removed. If such a situation should arise, the donor prefers that the organ be transplanted into a suitable  third-party recipient.    I discussed the possibility that living donor sometimes encounter problems obtaining health insurance or could have higher premium despite ongoing efforts of transplant professionals to educate insurance companies on this issue.    I discussed with German that donation is a voluntary activity and reiterated it should be done willingly and for altruistic reasons only.  I reviewed that no payment should be made for donating.  I also discussed that coercion, guilt, pressure, or feelings of obligation are not appropriate reasons to donate.  The option to withdraw at any time was emphasized.  German was reminded that a medical opt out can be given to protect his confidentiality, and no member of the transplant team will discuss specifics of his health or medical/social history with anyone else without permission.  The need for lifelong routine medical follow-up for optimal health, including routine health maintenance was reviewed.    Additionally, I discussed the need for our program to be able to contact living donors for UNOS reporting purposes for a minimum of 2 years.  Failure of our center to be able to provide such information could jeopardize our ability to continue to offer living donor transplants.  German voices understanding and agrees to this follow-up.    I discussed the UNOS requirement for centers to report donor status for a minimum of two years. German understands that failure to comply with requirement could have adverse consequences for our transplant program and agrees to cooperate with all our required follow-up.    I reviewed with German available lab results and other diagnostics from the evaluation process.    Coronavirus disease (COVID-19) caused by severe acute respiratory virus coronavirus 2 (SARS-C0V 2) is associated with increased mortality in solid organ transplant recipients (SOT) compared to non-transplant patients. Vaccine responses to vaccination are depressed  against SARS-CoV2 compared to normal individuals but improve with third vaccination doses. Vaccination prior to SOT provides both the best opportunity for transplant candidates to develop protective immunity and to reduce the risk of serious COVID19 infections post transplantation. Organ transplant candidates at [facility] will be required to receive SARS-CoV-2 vaccination prior to being listed with a an active status, whenever possible. Exceptions will be made for disability related reasons or for sincerely held Christian beliefs.

## 2023-10-27 ENCOUNTER — TELEPHONE (OUTPATIENT)
Dept: TRANSPLANT | Facility: CLINIC | Age: 48
End: 2023-10-27
Payer: COMMERCIAL

## 2023-10-27 DIAGNOSIS — Z00.5 WILLING TO BE KIDNEY DONOR: Primary | ICD-10-CM

## 2023-10-27 NOTE — TELEPHONE ENCOUNTER
Spoke with patient, let him know we were going to need additional testing: CBC and ID appointment.  All questions answered.  CBC scheduled, and will set up ID for a future date

## 2023-10-30 ENCOUNTER — LAB VISIT (OUTPATIENT)
Dept: LAB | Facility: HOSPITAL | Age: 48
End: 2023-10-30
Attending: NURSE PRACTITIONER
Payer: COMMERCIAL

## 2023-10-30 DIAGNOSIS — Z00.5 WILLING TO BE KIDNEY DONOR: ICD-10-CM

## 2023-10-30 LAB
BASOPHILS # BLD AUTO: 0.02 K/UL (ref 0–0.2)
BASOPHILS NFR BLD: 0.4 % (ref 0–1.9)
DIFFERENTIAL METHOD: ABNORMAL
EOSINOPHIL # BLD AUTO: 0.1 K/UL (ref 0–0.5)
EOSINOPHIL NFR BLD: 1.5 % (ref 0–8)
ERYTHROCYTE [DISTWIDTH] IN BLOOD BY AUTOMATED COUNT: 11.6 % (ref 11.5–14.5)
HCT VFR BLD AUTO: 37.5 % (ref 40–54)
HGB BLD-MCNC: 12.1 G/DL (ref 14–18)
IMM GRANULOCYTES # BLD AUTO: 0.01 K/UL (ref 0–0.04)
IMM GRANULOCYTES NFR BLD AUTO: 0.2 % (ref 0–0.5)
LYMPHOCYTES # BLD AUTO: 1.9 K/UL (ref 1–4.8)
LYMPHOCYTES NFR BLD: 40.9 % (ref 18–48)
MCH RBC QN AUTO: 30.7 PG (ref 27–31)
MCHC RBC AUTO-ENTMCNC: 32.3 G/DL (ref 32–36)
MCV RBC AUTO: 95 FL (ref 82–98)
MONOCYTES # BLD AUTO: 0.6 K/UL (ref 0.3–1)
MONOCYTES NFR BLD: 12.2 % (ref 4–15)
NEUTROPHILS # BLD AUTO: 2.1 K/UL (ref 1.8–7.7)
NEUTROPHILS NFR BLD: 44.8 % (ref 38–73)
NRBC BLD-RTO: 0 /100 WBC
PLATELET # BLD AUTO: 254 K/UL (ref 150–450)
PMV BLD AUTO: 9.4 FL (ref 9.2–12.9)
RBC # BLD AUTO: 3.94 M/UL (ref 4.6–6.2)
WBC # BLD AUTO: 4.67 K/UL (ref 3.9–12.7)

## 2023-10-30 PROCEDURE — 85025 COMPLETE CBC W/AUTO DIFF WBC: CPT | Mod: PN,TXP | Performed by: NURSE PRACTITIONER

## 2023-10-30 PROCEDURE — 36415 COLL VENOUS BLD VENIPUNCTURE: CPT | Mod: PN,TXP | Performed by: NURSE PRACTITIONER

## 2023-10-31 NOTE — PROGRESS NOTES
Psychiatry Initial Visit (PhD/LCSW)  Diagnostic Interview - CPT 07996     Date:  10/24/2023     Site:  [facility]    Referred by:  Yulia Mueller N.P.    Clinical status of patient:  Outpatient    Met with:  Patient    Chief complaint/reason for encounter:  Psychological Evaluation prior to donation of a kidney to an unrelated person    History of present illness: Mr. Schmid is a 48-year-old  male referred for psychological evaluation prior to donating a kidney to his friend.  He would like to donate a kidney to his best friend, Ayush Samuels, with whom he grew up since . He has been on dialysis for about 9 months. He was not asked by him, but rather offered on his own. Everyone around him is supportive of this decision.    The patient indicated there was no coercion or offer of payment for donation.  He was aware he could change his mind at any time without negative consequences.  He understands that he cannot donate a kidney again.  He understands that his future insurability may be affected.  He is aware that there may be a psychological let down after surgery.  He indicated that there was no financial hardship.  He was clearly competent to make the decision to donate.    Mr. Schmid identified that his sister, Dior Gonsalves will be his primary caregiver during recovery.  He also identified additional significant others who will assist with transplant, including his brother Candido Schmid. Based on his transplant donor psychosocial assessment with Gudelia Klein LCSW, he was considered a low risk candidate (see note dated 10/24/2023).     Mr. Schmid has no prior psychiatric history. He denied current depression, anxiety, or other psychological difficulties. He was pleasant and cooperative in interview.    Results of Psychological Testing were reviewed with the patient.    Pain scale: noncontributory    Symptoms:  Mood: Denied.  Anxiety:  Denied. He stated he has worries about normal  life things.  Cognitive functioning: Denied.    Psychiatric history: None reported.     Medical history:  None reported.    Family history of psychiatric illness:  None reported.    Social history (marriage, employment, etc.):  Mr. Schmid was born and raised in [place], [place] by his biological mother and grandmother along with one brother and one sister.  He described his childhood as nice, perfect, can't complain.  He denied childhood trauma, abuse, and neglect.  He performed average in school and earned a high school diploma.  He learned painting and [employer] finishing.  He is currently employed as a .  He is not on disability and finances are adequate.  He is single. He was previously  and  and reported, It just did not work out.  He has one daughter (age 34) and two grandchildren.  He currently lives alone in [place], [place].  He enjoys bowling weekly.      Substance use:  Alcohol: He reported social drinking, no abuse. He stated he drinks alcohol mostly weekends and will have a beer or two during the week some days.  Drugs: He reported smoking marijuana daily, mostly to assist with sleep initiation. He understands he will need to stop using marijuana 8 weeks prior to the surgery.   Tobacco: He stopped smoking cigarettes 12 years ago.    Caffeine: He drinks iced tea daily.    Strengths and Liabilities: Strength: Patient accepts guidance/feedback, Strength: Patient is physically healthy., Strength: Patient has positive support network., Strength: Patient has reasonable judgment., Strength: Patient is stable.    Mental Status Exam:  General appearance:  appears stated age, neatly dressed, well groomed  Speech:  normal rate and tone  Level of cooperation:  cooperative  Thought processes:  logical, goal-directed  Mood:  euthymic  Thought content:  no illusions, no visual hallucinations, no auditory hallucinations, no delusions, no active or passive homicidal thoughts, no active or passive  suicidal ideation, no obsessions, no compulsions, no violence  Affect:  appropriate  Orientation:  oriented to person, place, and time  Judgment and insight: fair  Language:  intact    Diagnostic impressions:    ICD-10-CM ICD-9-CM   1. Preop examination  Z01.818 V72.84   2. Willing to be kidney donor  Z00.5 V70.8     Plan:  This evaluation revealed no contraindications to kidney donation from the psychological perspective. No recommendations are forthcoming.    Time: 40 minutes

## 2023-10-31 NOTE — PSYCH TESTING
[facility]  [address] [place]  [address]  [phone]    REPORT OF PSYCHOLOGICAL EVALUATION    NAME: German Schmid  MRN #: [id number]  : 1975    REFERRED BY: Yulia Mueller N.P.    EVALUATED BY:  [place] Anaya, Ph.D., Psychologist  Sara Barlow, Psychometrician    DATES OF EVALUATION: 10/13/2023, 10/24/2023    EVALUATION PROCEDURES AND TIMES:  Conducted by Psychologist:  Interpretation and report of test data  Integration of information from diagnostic interview, medical record, and testing data  Conducted by Technician:  [place] Multiphasic Personality Inventory - 3 (MMPI-3)  [place] Clinical Multiaxial Inventory - III (MCMI-IV)  CPT Codes:  48852 - 1 unit; +11297 - 1 unit; 64974 - 1 unit; +06088 - 1 unit  Time: Psychologist - 2 hours; Technician - 1    EVALUATION FINDINGS:  The diagnostic interview revealed that Mr. Schmid is interested in donating a kidney to an unrelated friend, Ayush Samuels. He stated he has been friends with Mr. Samuels since . Ms. Schmid reports everyone around him is supportive of this decision. He is aware of the potential risks. He understands he will need to discontinue using marijuana for at least 8 weeks prior to the surgery.    The medical record revealed medical history of no psychological disorders or treatment and no significant health problems.    TEST DATA:  Psychological Testing data revealed that he was fully cooperative and engaged in the assessment process. He was alert and cooperative during the evaluation.  Effort on all tests was satisfactory to produce valid results.    The MMPI-3 profile validity scales suggested he responded to the test in a straightforward manner.  His responses to the test indicate difficulties worry and reports a history of antisocial behavior and  juvenile conduct problems.        The MCMI-IV suggested the presence of negativistic, turbulent, paranoid, and dependent personality traits.    DIAGNOSTIC  IMPRESSIONS:    ICD-10-CM ICD-9-CM   1. Preop examination  Z01.818 V72.84   2. Willing to be kidney donor  Z00.5 V70.8     SUMMARY AND RECOMMENDATIONS:  Mr. Schmid has no psychiatric history.  He was appropriate, pleasant, and cooperative in interview and appeared to be in good spirits.  Test data were mostly within normal limits with only suggestion of worries. This evaluation revealed no contraindications to kidney donation from the psychological perspective.  No recommendations are forthcoming.

## 2023-11-10 ENCOUNTER — COMMITTEE REVIEW (OUTPATIENT)
Dept: TRANSPLANT | Facility: CLINIC | Age: 48
End: 2023-11-10
Payer: COMMERCIAL

## 2023-11-10 DIAGNOSIS — Z00.5 WILLING TO BE KIDNEY DONOR: Primary | ICD-10-CM

## 2023-11-10 NOTE — COMMITTEE REVIEW
German Schmid was presented at selection committee on 11/10/2023. Patient did meet selection criteria for living kidney donation pending ID treatment for strongyloides and colonoscopy.  Anemia discussed with team and found to be acceptable.  Approved for left kidney donation.  No absolute contraindications noted.    Spoke with donor regarding committee's decision.  All questions answered.  Notified him that he would need to get us his colonoscopy records in order for him to have surgery.  Patent voiced understanding.    Note written by Raina Johnson RN.    ================================================================  I was present at the meeting and attest to the general consensus of the committee.   Joao Vasques Jr.

## 2023-11-13 ENCOUNTER — TELEPHONE (OUTPATIENT)
Dept: TRANSPLANT | Facility: CLINIC | Age: 48
End: 2023-11-13
Payer: COMMERCIAL

## 2023-11-21 ENCOUNTER — TELEPHONE (OUTPATIENT)
Dept: TRANSPLANT | Facility: CLINIC | Age: 48
End: 2023-11-21
Payer: COMMERCIAL

## 2023-11-21 NOTE — TELEPHONE ENCOUNTER
"TAYLER completed pt's [facility] ([facility]) request.     Pt will need to complete the "self-service" portion of the NLD[employer] application. Thereafter, TAYLER will be notified to review, addend and or submit application.     SW remains available to pt and family.     "

## 2023-11-29 ENCOUNTER — SOCIAL WORK (OUTPATIENT)
Dept: TRANSPLANT | Facility: CLINIC | Age: 48
End: 2023-11-29
Payer: COMMERCIAL

## 2023-11-29 NOTE — PROGRESS NOTES
SW received update from [facility], regarding pt's application.     SW requested further completion of [facility] application for the following benefits: lost wages and travel reimbursement.   SW explained pt and potential recipient will need to provide proof of income.     Once this request is complete, SW will submit pt's application for living donor financial assistance.     SW remains available.

## 2023-12-01 ENCOUNTER — OFFICE VISIT (OUTPATIENT)
Dept: INFECTIOUS DISEASES | Facility: CLINIC | Age: 48
End: 2023-12-01
Payer: COMMERCIAL

## 2023-12-01 VITALS
HEIGHT: 71 IN | SYSTOLIC BLOOD PRESSURE: 128 MMHG | WEIGHT: 196 LBS | BODY MASS INDEX: 27.44 KG/M2 | TEMPERATURE: 98 F | DIASTOLIC BLOOD PRESSURE: 81 MMHG | HEART RATE: 64 BPM

## 2023-12-01 DIAGNOSIS — Z00.5 WILLING TO BE KIDNEY DONOR: ICD-10-CM

## 2023-12-01 PROCEDURE — 99204 PR OFFICE/OUTPT VISIT, NEW, LEVL IV, 45-59 MIN: ICD-10-PCS | Mod: S$GLB,TXP,, | Performed by: INTERNAL MEDICINE

## 2023-12-01 PROCEDURE — 3074F SYST BP LT 130 MM HG: CPT | Mod: CPTII,S$GLB,TXP, | Performed by: INTERNAL MEDICINE

## 2023-12-01 PROCEDURE — 3044F HG A1C LEVEL LT 7.0%: CPT | Mod: CPTII,S$GLB,TXP, | Performed by: INTERNAL MEDICINE

## 2023-12-01 PROCEDURE — 1159F PR MEDICATION LIST DOCUMENTED IN MEDICAL RECORD: ICD-10-PCS | Mod: CPTII,S$GLB,TXP, | Performed by: INTERNAL MEDICINE

## 2023-12-01 PROCEDURE — 99999 PR PBB SHADOW E&M-EST. PATIENT-LVL III: ICD-10-PCS | Mod: PBBFAC,TXP,, | Performed by: INTERNAL MEDICINE

## 2023-12-01 PROCEDURE — 99204 OFFICE O/P NEW MOD 45 MIN: CPT | Mod: S$GLB,TXP,, | Performed by: INTERNAL MEDICINE

## 2023-12-01 PROCEDURE — 3079F PR MOST RECENT DIASTOLIC BLOOD PRESSURE 80-89 MM HG: ICD-10-PCS | Mod: CPTII,S$GLB,TXP, | Performed by: INTERNAL MEDICINE

## 2023-12-01 PROCEDURE — 1159F MED LIST DOCD IN RCRD: CPT | Mod: CPTII,S$GLB,TXP, | Performed by: INTERNAL MEDICINE

## 2023-12-01 PROCEDURE — 3044F PR MOST RECENT HEMOGLOBIN A1C LEVEL <7.0%: ICD-10-PCS | Mod: CPTII,S$GLB,TXP, | Performed by: INTERNAL MEDICINE

## 2023-12-01 PROCEDURE — 3074F PR MOST RECENT SYSTOLIC BLOOD PRESSURE < 130 MM HG: ICD-10-PCS | Mod: CPTII,S$GLB,TXP, | Performed by: INTERNAL MEDICINE

## 2023-12-01 PROCEDURE — 3008F PR BODY MASS INDEX (BMI) DOCUMENTED: ICD-10-PCS | Mod: CPTII,S$GLB,TXP, | Performed by: INTERNAL MEDICINE

## 2023-12-01 PROCEDURE — 99999 PR PBB SHADOW E&M-EST. PATIENT-LVL III: CPT | Mod: PBBFAC,TXP,, | Performed by: INTERNAL MEDICINE

## 2023-12-01 PROCEDURE — 3079F DIAST BP 80-89 MM HG: CPT | Mod: CPTII,S$GLB,TXP, | Performed by: INTERNAL MEDICINE

## 2023-12-01 PROCEDURE — 3008F BODY MASS INDEX DOCD: CPT | Mod: CPTII,S$GLB,TXP, | Performed by: INTERNAL MEDICINE

## 2023-12-01 RX ORDER — IVERMECTIN 3 MG/1
200 TABLET ORAL DAILY
Qty: 24 TABLET | Refills: 0 | Status: SHIPPED | OUTPATIENT
Start: 2023-12-01 | End: 2023-12-05

## 2023-12-01 NOTE — PROGRESS NOTES
Infectious Diseases Clinic Note    Subjective:       Patient ID: German Schmid is a 48 y.o. male.    Chief Complaint: No chief complaint on file.    HPI    49 y/o M no PMH here after found to have Strongy IGG positive during kidney donor evaluation    Works in construction  No diarrhea  No eosinophilia     No past medical history on file.    Social History     Socioeconomic History    Marital status: Single   Tobacco Use    Smoking status: Former     Types: Cigarettes    Tobacco comments:     QUIT SMOKING 2011   Substance and Sexual Activity    Alcohol use: Yes     Comment: 6 drinks/week    Drug use: Yes     Types: Marijuana    Sexual activity: Yes       No current outpatient medications on file.    Review of Systems   All other systems reviewed and are negative.        No past surgical history on file.     Reviewed records today as well as relevant labs, cultures, and imaging    Objective:      Vitals:    12/01/23 0900   BP: 128/81   Pulse: 64   Temp: 97.9 °F (36.6 °C)       There were no vitals filed for this visit.  Physical Exam  Vitals reviewed.             Assessment/Plan:       1. Willing to be kidney donor        49 y/o M no PMH here after found to have Strongy IGG positive during kidney donor evaluation, asymptomatic  - Will treat with ivermectin - recipient will also need therapy       Discussed care with transplant team/provider

## 2023-12-04 ENCOUNTER — SOCIAL WORK (OUTPATIENT)
Dept: TRANSPLANT | Facility: CLINIC | Age: 48
End: 2023-12-04
Payer: COMMERCIAL

## 2023-12-04 NOTE — PROGRESS NOTES
[facility].     SW requested further completion of [facility] for the following benefits: lost wages, travel reimbursement and dependents care.     TAYLER received the below email from [facility]:     Thank you for submitting the [facility] for German Schmid. The recipient wrote down $79,000 as his household income on Step 4 Income Verification page but based on the submitted paystubs his annual income is $06122 which is above the federal poverty guidelines. Could you please ask the recipient to fill out and submit the attached financial hardship waiver form?     Also, the donor claims that his income is $25715 on Step 4 but based on his paystubs, his annual income is $59646. Could you please ask him if he has other sources of income?     TAYLER followed up with the donor and recipient regarding the above request.     Once this request is complete, SW will submit pt's application for living donor financial assistance.     TAYLER remains available.

## 2023-12-07 ENCOUNTER — TELEPHONE (OUTPATIENT)
Dept: TRANSPLANT | Facility: CLINIC | Age: 48
End: 2023-12-07
Payer: COMMERCIAL

## 2023-12-07 NOTE — TELEPHONE ENCOUNTER
SW returned pt's call regarding concerns with [employer] application's  income discrepancy. SW explained email from previous note and inquired if income has changed based on pay stubs. Pt denies and reports he will provide W2 form.      SW informed NLDAC and will follow up.          ----- Message from Raina Johnson RN sent at 12/6/2023  3:39 PM CST -----  When you get a chance, could you call him?  He's confused about the income discrepancy.

## 2023-12-11 DIAGNOSIS — Z00.5 TRANSPLANT DONOR EVALUATION: Primary | ICD-10-CM

## 2024-01-09 ENCOUNTER — TELEPHONE (OUTPATIENT)
Dept: PREADMISSION TESTING | Facility: HOSPITAL | Age: 49
End: 2024-01-09
Payer: COMMERCIAL

## 2024-01-09 NOTE — ANESTHESIA PAT ROS NOTE
01/09/2024  German Schmid is a 48 y.o., male, KIDNEY DONOR, presents to periop center for Anesthesia visit and preop instruction      Pre-op Assessment    I have reviewed the Patient Summary Reports.     I have reviewed the Nursing Notes. I have reviewed the NPO Status.   I have reviewed the Medications.     Review of Systems  Anesthesia Hx:  No problems with previous Anesthesia  No General Anesthesia in the past           Denies Family Hx of Anesthesia complications.    Denies Personal Hx of Anesthesia complications.                    Social:  No Alcohol Use, Former Smoker   Former Smoker  Types Cigarettes  Comment  QUIT SMOKING 2011        Hematology/Oncology:  Hematology Normal   Oncology Normal                                   EENT/Dental:  EENT/Dental Normal           Cardiovascular:  Cardiovascular Normal Exercise tolerance: good   Denies Pacemaker.  Denies Hypertension.       Denies Angina.     no hyperlipidemia  Denies TOBIN.    Functional Capacity 4 METS                         Pulmonary:        Sleep Apnea, CPAP                Renal/:   Denies Chronic Renal Disease. no renal calculi  KIDNEY DONOR             Hepatic/GI:  Hepatic/GI Normal                 Musculoskeletal:  Arthritis    Musculoskeletal General/Symptoms: sciatica, low back pain.    Joint Disease:  Arthritis, Osteoarthritis          Neurological:  Neurology Normal Denies TIA.  Denies CVA.    Denies Seizures.        Osteoarthritis                           Endocrine:  Endocrine Normal Denies Diabetes. Denies Hypothyroidism.  Denies Hyperthyroidism.         Dermatological:  Skin Normal    Psych:  Psychiatric Normal                    Physical Exam  General: Well nourished, Cooperative, Alert and Oriented    Airway:  Mouth Opening: Normal  Tongue: Normal  Neck ROM: Normal ROM    Dental:  Intact    Chest/Lungs:  Clear to auscultation,  Normal Respiratory Rate    Heart:  Rate: Normal  Rhythm: Regular Rhythm  Sounds: Normal          Anesthesia Assessment: Preoperative EQUATION

## 2024-01-09 NOTE — DISCHARGE INSTRUCTIONS
Your surgery has been scheduled for:______1/22/2024____________________________________    You should report to:  ____[facility], located on the [place] side of the first floor of the           [facility] ([phone])  ___x_[facility], located on the [facility] side of the            Second floor of the [facility] ([phone])  ____3rd Floor [facility] located on the [facility] side of the [facility] [phone]  ____[facility]: located [address] [address]     Please Note   Tell your doctor if you take Aspirin, products containing Aspirin, herbal medications  or blood thinners, such as Coumadin, Ticlid, or Plavix.  (Consult your provider regarding holding or stopping before surgery).  Arrange for someone to drive you home following surgery.  You will not be allowed to leave the surgical facility alone or drive yourself home following sedation and anesthesia.    Before Surgery  Stop taking all herbal medications, vitamins, and supplements 7 days prior to surgery  No Motrin/Advil (Ibuprofen) 7 days before surgery  No Aleve (Naproxen) 7 days before surgery  Stop Taking Asprin, products containing Asprin __7___days before surgery  Stop taking blood thinners_______days before surgery  No Goody's/BC  Powder 7 days before surgery  Refrain from drinking alcoholic beverages for 24hours before and after surgery  Stop or limit smoking ___7______days before surgery  You may take Tylenol for pain    Night before Surgery  Do not eat or drink after midnight  Take a shower or bath (shower is recommended).  Bathe with Hibiclens soap or an antibacterial soap from the neck down.  If not supplied by your surgeon, hibiclens soap will need to be purchased over the counter in pharmacy.  Rinse soap off thoroughly.  Shampoo your hair with your regular shampoo    The Day of  Surgery  Take another bath or shower with hibiclens or any antibacterial soap, to reduce the chance of infection.  Take heart and blood pressure medications with a small sip of water, as advised by the perioperative team.  Do not take fluid pills  You may brush your teeth and rinse your mouth, but do not swall any additional water.   Do not apply perfumes, powder, body lotions or deodorant on the day of surgery.  Nail polish should be removed.  Do not wear makeup or moisturizer  Wear comfortable clothes, such as a button front shirt and loose fitting pants.  Leave all jewelry, including body piercings, and valuables at home.    Bring any devices you will neeed after surgery such as crutches or canes.  If you have sleep apnea, please bring your CPAP machine  In the event that your physical condition changes including the onset of a cold or respiratory illness, or if you have to delay or cancel your surgery, please notify your surgeon.       Anesthesia: General Anesthesia     You are watched continuously during your procedure by your anesthesia provider.     Youre due to have surgery. During surgery, youll be given medicine called anesthesia or anesthetic. This will keep you comfortable and pain-free. Your anesthesia provider will use general anesthesia.  What is general anesthesia?  General anesthesia puts you into a state like deep sleep. It goes into the bloodstream (IV anesthetics), into the lungs (gas anesthetics), or both. You feel nothing during the procedure. You will not remember it. During the procedure, the anesthesia provider monitors you continuously. He or she checks your heart rate and rhythm, blood pressure, breathing, and blood oxygen.  IV anesthetics. IV anesthetics are given through an IV line in your arm. Theyre often given first. This is so you are asleep before a gas anesthetic is started. Some kinds of IV anesthetics relieve pain. Others relax you. Your doctor will decide which kind is best  in your case.  Gas anesthetics. Gas anesthetics are breathed into the lungs. They are often used to keep you asleep. They can be given through a facemask or a tube placed in your larynx or trachea (breathing tube).  If you have a facemask, your anesthesia provider will most likely place it over your nose and mouth while youre still awake. Youll breathe oxygen through the mask as your IV anesthetic is started. Gas anesthetic may be added through the mask.  If you have a tube in the larynx or trachea, it will be inserted into your throat after youre asleep.  Anesthesia tools and medicines  You will likely have:  IV anesthetics. These are put into an IV line into your bloodstream.  Gas anesthetics. You breathe these anesthetics into your lungs, where they pass into your bloodstream.  Pulse oximeter. This is a small clip that is attached to the end of your finger. This measures your blood oxygen level.  Electrocardiography leads (electrodes). These are small sticky pads that are placed on your chest. They record your heart rate and rhythm.  Blood pressure cuff. This reads your blood pressure.  Risks and possible complications  General anesthesia has some risks. These include:  Breathing problems  Nausea and vomiting  Sore throat or hoarseness (usually temporary)  Allergic reaction to the anesthetic  Irregular heartbeat (rare)  Cardiac arrest (rare)   Anesthesia safety  Follow all instructions you are given for how long not to eat or drink before your procedure.  Be sure your doctor knows what medicines and drugs you take. This includes over-the-counter medicines, herbs, supplements, alcohol or other drugs. You will be asked when those were last taken.  Have an adult family member or friend drive you home after the procedure.  For the first 24 hours after your surgery:  Do not drive or use heavy equipment.  Do not make important decisions or sign legal documents. If important decisions or signing legal documents is  necessary during the first 24 hours after surgery, have a trusted family member or spouse act on your behalf.  Avoid alcohol.  Have a responsible adult stay with you. He or she can watch for problems and help keep you safe.  Date Last Reviewed: 12/1/2016 © 2000-2017 [employer]. [address] [place] [address], [place] [address]. All rights reserved. This information is not intended as a substitute for professional medical care. Always follow your healthcare professional's instructions.

## 2024-01-10 ENCOUNTER — OFFICE VISIT (OUTPATIENT)
Dept: TRANSPLANT | Facility: CLINIC | Age: 49
End: 2024-01-10
Payer: COMMERCIAL

## 2024-01-10 ENCOUNTER — ANESTHESIA EVENT (OUTPATIENT)
Dept: SURGERY | Facility: HOSPITAL | Age: 49
DRG: 660 | End: 2024-01-10
Payer: COMMERCIAL

## 2024-01-10 ENCOUNTER — HOSPITAL ENCOUNTER (OUTPATIENT)
Dept: PREADMISSION TESTING | Facility: HOSPITAL | Age: 49
Discharge: HOME | End: 2024-01-10
Attending: TRANSPLANT SURGERY
Payer: COMMERCIAL

## 2024-01-10 ENCOUNTER — CLINICAL SUPPORT (OUTPATIENT)
Dept: TRANSPLANT | Facility: CLINIC | Age: 49
End: 2024-01-10
Payer: COMMERCIAL

## 2024-01-10 ENCOUNTER — SOCIAL WORK (OUTPATIENT)
Dept: TRANSPLANT | Facility: CLINIC | Age: 49
End: 2024-01-10
Payer: COMMERCIAL

## 2024-01-10 ENCOUNTER — LAB VISIT (OUTPATIENT)
Dept: LAB | Facility: HOSPITAL | Age: 49
End: 2024-01-10
Attending: INTERNAL MEDICINE
Payer: COMMERCIAL

## 2024-01-10 VITALS
TEMPERATURE: 97 F | OXYGEN SATURATION: 99 % | DIASTOLIC BLOOD PRESSURE: 79 MMHG | HEART RATE: 78 BPM | WEIGHT: 190.25 LBS | HEART RATE: 78 BPM | RESPIRATION RATE: 18 BRPM | HEIGHT: 71 IN | SYSTOLIC BLOOD PRESSURE: 134 MMHG | DIASTOLIC BLOOD PRESSURE: 79 MMHG | WEIGHT: 190.25 LBS | RESPIRATION RATE: 18 BRPM | DIASTOLIC BLOOD PRESSURE: 79 MMHG | HEART RATE: 78 BPM | HEIGHT: 71 IN | OXYGEN SATURATION: 99 % | BODY MASS INDEX: 26.64 KG/M2 | TEMPERATURE: 97 F | BODY MASS INDEX: 26.64 KG/M2 | SYSTOLIC BLOOD PRESSURE: 134 MMHG | OXYGEN SATURATION: 99 % | OXYGEN SATURATION: 99 % | BODY MASS INDEX: 26.64 KG/M2 | TEMPERATURE: 97 F | BODY MASS INDEX: 26.64 KG/M2 | SYSTOLIC BLOOD PRESSURE: 134 MMHG | RESPIRATION RATE: 18 BRPM | TEMPERATURE: 97 F | HEIGHT: 71 IN | WEIGHT: 190.25 LBS | WEIGHT: 190.25 LBS | HEART RATE: 78 BPM | SYSTOLIC BLOOD PRESSURE: 134 MMHG | HEIGHT: 71 IN | RESPIRATION RATE: 18 BRPM | DIASTOLIC BLOOD PRESSURE: 79 MMHG

## 2024-01-10 VITALS
WEIGHT: 195.13 LBS | OXYGEN SATURATION: 99 % | RESPIRATION RATE: 16 BRPM | TEMPERATURE: 98 F | HEIGHT: 71 IN | DIASTOLIC BLOOD PRESSURE: 87 MMHG | HEART RATE: 82 BPM | BODY MASS INDEX: 27.32 KG/M2 | SYSTOLIC BLOOD PRESSURE: 130 MMHG

## 2024-01-10 DIAGNOSIS — Z52.4 KIDNEY DONOR: Primary | ICD-10-CM

## 2024-01-10 DIAGNOSIS — Z52.4 ENCOUNTER FOR DONATION OF KIDNEY: Primary | ICD-10-CM

## 2024-01-10 DIAGNOSIS — Z00.5 TRANSPLANT DONOR EVALUATION: Primary | ICD-10-CM

## 2024-01-10 DIAGNOSIS — Z00.5 WILLING TO BE KIDNEY DONOR: ICD-10-CM

## 2024-01-10 DIAGNOSIS — Z00.5 TRANSPLANT DONOR EVALUATION: ICD-10-CM

## 2024-01-10 DIAGNOSIS — Z52.4 KIDNEY DONOR: ICD-10-CM

## 2024-01-10 LAB
ALBUMIN SERPL BCP-MCNC: 3.9 G/DL (ref 3.5–5.2)
ALP SERPL-CCNC: 57 U/L (ref 55–135)
ALT SERPL W/O P-5'-P-CCNC: 23 U/L (ref 10–44)
ANION GAP SERPL CALC-SCNC: 6 MMOL/L (ref 8–16)
APTT PPP: 26.2 SEC (ref 21–32)
AST SERPL-CCNC: 19 U/L (ref 10–40)
BASOPHILS # BLD AUTO: 0.01 K/UL (ref 0–0.2)
BASOPHILS NFR BLD: 0.3 % (ref 0–1.9)
BILIRUB SERPL-MCNC: 0.5 MG/DL (ref 0.1–1)
BUN SERPL-MCNC: 13 MG/DL (ref 6–20)
CALCIUM SERPL-MCNC: 9 MG/DL (ref 8.7–10.5)
CHLORIDE SERPL-SCNC: 105 MMOL/L (ref 95–110)
CO2 SERPL-SCNC: 27 MMOL/L (ref 23–29)
CREAT SERPL-MCNC: 1.3 MG/DL (ref 0.5–1.4)
DIFFERENTIAL METHOD BLD: ABNORMAL
EOSINOPHIL # BLD AUTO: 0 K/UL (ref 0–0.5)
EOSINOPHIL NFR BLD: 1.1 % (ref 0–8)
ERYTHROCYTE [DISTWIDTH] IN BLOOD BY AUTOMATED COUNT: 12.4 % (ref 11.5–14.5)
EST. GFR  (NO RACE VARIABLE): >60 ML/MIN/1.73 M^2
GLUCOSE SERPL-MCNC: 96 MG/DL (ref 70–110)
HCT VFR BLD AUTO: 41.3 % (ref 40–54)
HGB BLD-MCNC: 13.5 G/DL (ref 14–18)
IMM GRANULOCYTES # BLD AUTO: 0.01 K/UL (ref 0–0.04)
IMM GRANULOCYTES NFR BLD AUTO: 0.3 % (ref 0–0.5)
INR PPP: 1 (ref 0.8–1.2)
LYMPHOCYTES # BLD AUTO: 1.8 K/UL (ref 1–4.8)
LYMPHOCYTES NFR BLD: 48 % (ref 18–48)
MCH RBC QN AUTO: 31 PG (ref 27–31)
MCHC RBC AUTO-ENTMCNC: 32.7 G/DL (ref 32–36)
MCV RBC AUTO: 95 FL (ref 82–98)
MONOCYTES # BLD AUTO: 0.6 K/UL (ref 0.3–1)
MONOCYTES NFR BLD: 14.8 % (ref 4–15)
NEUTROPHILS # BLD AUTO: 1.4 K/UL (ref 1.8–7.7)
NEUTROPHILS NFR BLD: 35.5 % (ref 38–73)
NRBC BLD-RTO: 0 /100 WBC
PLATELET # BLD AUTO: 276 K/UL (ref 150–450)
PMV BLD AUTO: 9.5 FL (ref 9.2–12.9)
POTASSIUM SERPL-SCNC: 4.3 MMOL/L (ref 3.5–5.1)
PROT SERPL-MCNC: 7.3 G/DL (ref 6–8.4)
PROTHROMBIN TIME: 10.5 SEC (ref 9–12.5)
RBC # BLD AUTO: 4.35 M/UL (ref 4.6–6.2)
SODIUM SERPL-SCNC: 138 MMOL/L (ref 136–145)
WBC # BLD AUTO: 3.79 K/UL (ref 3.9–12.7)

## 2024-01-10 PROCEDURE — 36415 COLL VENOUS BLD VENIPUNCTURE: CPT | Mod: TXP | Performed by: INTERNAL MEDICINE

## 2024-01-10 PROCEDURE — 99999 PR PBB SHADOW E&M-EST. PATIENT-LVL III: CPT | Mod: PBBFAC,TXP,,

## 2024-01-10 PROCEDURE — 85610 PROTHROMBIN TIME: CPT | Mod: TXP | Performed by: INTERNAL MEDICINE

## 2024-01-10 PROCEDURE — 86703 HIV-1/HIV-2 1 RESULT ANTBDY: CPT | Mod: TXP | Performed by: INTERNAL MEDICINE

## 2024-01-10 PROCEDURE — 85730 THROMBOPLASTIN TIME PARTIAL: CPT | Mod: TXP | Performed by: INTERNAL MEDICINE

## 2024-01-10 PROCEDURE — 3066F NEPHROPATHY DOC TX: CPT | Mod: TXP,,, | Performed by: STUDENT IN AN ORGANIZED HEALTH CARE EDUCATION/TRAINING PROGRAM

## 2024-01-10 PROCEDURE — 99999 PR PBB SHADOW E&M-EST. PATIENT-LVL III: CPT | Mod: PBBFAC,TXP,, | Performed by: STUDENT IN AN ORGANIZED HEALTH CARE EDUCATION/TRAINING PROGRAM

## 2024-01-10 PROCEDURE — 86704 HEP B CORE ANTIBODY TOTAL: CPT | Mod: TXP | Performed by: INTERNAL MEDICINE

## 2024-01-10 PROCEDURE — 99999 PR PBB SHADOW E&M-EST. PATIENT-LVL I: CPT | Mod: PBBFAC,TXP,,

## 2024-01-10 PROCEDURE — 85025 COMPLETE CBC W/AUTO DIFF WBC: CPT | Mod: TXP | Performed by: INTERNAL MEDICINE

## 2024-01-10 PROCEDURE — 99214 OFFICE O/P EST MOD 30 MIN: CPT | Mod: S$PBB,TXP,, | Performed by: STUDENT IN AN ORGANIZED HEALTH CARE EDUCATION/TRAINING PROGRAM

## 2024-01-10 PROCEDURE — 80053 COMPREHEN METABOLIC PANEL: CPT | Mod: TXP | Performed by: INTERNAL MEDICINE

## 2024-01-10 PROCEDURE — 86803 HEPATITIS C AB TEST: CPT | Mod: TXP | Performed by: INTERNAL MEDICINE

## 2024-01-10 PROCEDURE — 99999 PR PBB SHADOW E&M-EST. PATIENT-LVL III: CPT | Mod: PBBFAC,TXP,, | Performed by: TRANSPLANT SURGERY

## 2024-01-10 PROCEDURE — 99499 UNLISTED E&M SERVICE: CPT | Mod: S$PBB,TXP,, | Performed by: TRANSPLANT SURGERY

## 2024-01-10 PROCEDURE — 87535 HIV-1 PROBE&REVERSE TRNSCRPJ: CPT | Mod: TXP | Performed by: INTERNAL MEDICINE

## 2024-01-10 PROCEDURE — 87340 HEPATITIS B SURFACE AG IA: CPT | Mod: TXP | Performed by: INTERNAL MEDICINE

## 2024-01-10 RX ORDER — HEPARIN SODIUM 5000 [USP'U]/ML
5000 INJECTION, SOLUTION INTRAVENOUS; SUBCUTANEOUS
Status: CANCELLED | OUTPATIENT
Start: 2024-01-10

## 2024-01-10 RX ORDER — CEFAZOLIN SODIUM 2 G/50ML
2 SOLUTION INTRAVENOUS
Status: CANCELLED | OUTPATIENT
Start: 2024-01-10

## 2024-01-10 NOTE — LETTER
January 10, 2024                     [facility]  [address]  [facility] [address]  Phone: [phone]   Patient: German Schmid   MR Number: [id number]   YOB: 1975   Date of Visit: 1/10/2024       Dear      Thank you for referring German Schmid to me for evaluation. Attached you will find relevant portions of my assessment and plan of care.    If you have questions, please do not hesitate to call me. I look forward to following German Schmid along with you.    Sincerely,    Joao Vasques Jr., MD    Enclosure    If you would like to receive this communication electronically, please contact [email or web address] or [phone] to request Med[employer] Link access.    [employer] Link is a tool which provides read-only access to select patient information with whom you have a relationship. Its easy to use and provides real time access to review your patients record including encounter summaries, notes, results, and demographic information.    If you feel you have received this communication in error or would no longer like to receive these types of communications, please e-mail [email or web address]

## 2024-01-10 NOTE — LETTER
January 10, 2024                     [facility]  [address]  [facility] [address]  Phone: [phone]   Patient: German Schmid   MR Number: [id number]   YOB: 1975   Date of Visit: 1/10/2024       Dear      Thank you for referring German Schmid to me for evaluation. Attached you will find relevant portions of my assessment and plan of care.    If you have questions, please do not hesitate to call me. I look forward to following German Schmid along with you.    Sincerely,    Dorian Workman MD    Enclosure    If you would like to receive this communication electronically, please contact [email or web address] or [phone] to request [employer] Link access.    [employer] Link is a tool which provides read-only access to select patient information with whom you have a relationship. Its easy to use and provides real time access to review your patients record including encounter summaries, notes, results, and demographic information.    If you feel you have received this communication in error or would no longer like to receive these types of communications, please e-mail [email or web address]

## 2024-01-10 NOTE — PROGRESS NOTES
Met with German Schmid in the clinic as part of her pre-transplant DONOR clearance appointment.    1) Performed a complete medication reconciliation.    2) Obtained copies of insurance cards, patient understood that the Pharm.D. will order medications, and that medications must be available prior to discharge   3) Discussed medication education that will occur post-transplant   4) Patient will use [facility] for first fill.  5)  was checked: n/a    No current outpatient medications on file.     No current facility-administered medications for this visit.       Patient verbalized understanding and had the opportunity to ask questions

## 2024-01-10 NOTE — PROGRESS NOTES
Kidney Donor Preoperative Evaluation    Subjective:     CC:  Preoperative evaluation before donor nephrectomy.    HPI:  Mr. Schmid is a 48 y.o. year old Black or  male who has been approved to be a living unrelated donor for PKD.  He denies any changes in his health condition since his previous visit. We discussed risk of kidney failure post donation, his mild leukopenia and anemia. We discussed his high cholesterol, monitoring BP after donation, following with primary care.    Patient denies any history of coronary artery disease, stroke, seizure disorder, chronic obstructive pulmonary disease, liver disease, kidney stones, gallstones, deep venous thrombosis, pulmonary embolism, recurrent urinary tract infections or malignancies.    Review of Systems   Constitutional: Negative.    HENT: Negative.     Eyes: Negative.    Respiratory: Negative.     Cardiovascular: Negative.    Gastrointestinal: Negative.    Endocrine: Negative.    Genitourinary: Negative.    Musculoskeletal: Negative.    Skin: Negative.    Neurological: Negative.    Psychiatric/Behavioral: Negative.         Objective:   body mass index is 26.52 kg/m².  Physical Exam  Vitals and nursing note reviewed.   Constitutional:       General: He is not in acute distress.  Eyes:      General: No scleral icterus.  Cardiovascular:      Rate and Rhythm: Normal rate.   Pulmonary:      Effort: Pulmonary effort is normal. No respiratory distress.   Musculoskeletal:      Right lower leg: No edema.      Left lower leg: No edema.   Skin:     Coloration: Skin is not jaundiced.   Neurological:      Mental Status: He is alert.         Labs:     1/10/2024: Creatinine 1.3 mg/dL (Ref range: 0.5 - 1.4 mg/dL); BUN 13 mg/dL (Ref range: 6 - 20 mg/dL)       Labs were reviewed with the patient.    ABO type: A POS    Assessment:     1. Encounter for donation of kidney        Plan:   Donor Candidacy:   Mr. Schmid remains an excellent candidate for kidney donation.  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    Joao Vasques Jr., MD         Counseling:   I discussed with Mr. Schmid that donation is a voluntary activity and reiterated it should be done willingly and for altruistic reasons only.  I reviewed that no payment should be received for donating.  I also discussed that coercion, guilt, pressure, or feelings of obligation are not appropriate reasons to donate.  The option to withdraw at any time was emphasized.  Mr. Schmid was reminded that a medical opt out can be given to protect his confidentiality, and no member of the transplant team will discuss specifics of his health or medical/social history with anyone else without permission.  The need for lifelong routine medical follow-up for optimal health, including routine health maintenance was reviewed.    We also discussed the long term risks associated with kidney donation.  I told the patient that his GFR should return to within 75-80% of pre-donation level within six months of donation.  I informed the patient that there is a small risk of developing albuminuria and hypertension following donor nephrectomy.  I also informed the patient that based on current literature, the risk of developing end-stage renal disease following donor nephrectomy is similar to the general population.    Patient advised that it is recommended that all transplant candidates, and their close contacts and household members receive Covid vaccination.    I rereviewed with Mr. Schmid available lab results and other diagnostics from the evaluation process    Additional Counseling:   The patient was counseled on the need for regular follow-up with a primary care physician for blood pressure and cholesterol screening.  The importance of age appropriate health screening was also emphasized.    Follow-up:  Follow-up with transplant surgery per protocol.

## 2024-01-10 NOTE — PROGRESS NOTES
DONOR PRE-OP NOTE    Potential Donor Name: German Schmid, [id number]  Encounter Date: 1/10/2024    Sex: male  YOB: 1975  Age: 48 y.o.    Housing/Contact Info:  [address]  [place] [address]  Telephone Information:   Mobile [phone]    Home: [phone] (home)  Work: There is no work phone number on file.  E-mail: [email or web address]    Potential Surgery Date: January 22,2024  Potential Recipient Name: Ayush Samuels, Clinic Number: [id number]  Potential Recipient Relationship: friend    Patient presents as alert and oriented x 4, pleasant, good eye contact, well groomed, recall good, concentration/judgement good, average intelligence, calm, communicative, cooperative, and asking and answering questions appropriately. Patient presents as a 48 y.o. year old male to donor pre-op appointment for scheduled kidney living donor surgery. Patient is unaccompanied.  Patient states that he is independent with ADLs at this time.  Patient states continued motivation to pursue organ donation at this time.    Does patient drive?  Patient is aware will not be able to drive until medically cleared by the transplant team. Patient verbalizes understanding that patient will need assistance for all transportation needs until medically cleared to drive.    Caregivers/Transportation:  Name: Dior Gonsalves  Age: 51  Phone: [phone]  Relationship: sister  Does person drive? yes  Does person have own/reliable transportation? yes    Name: Candido Schmid  Age: 38  Phone: [phone]  Relationship: brother  Does person drive? yes  Does person have own/reliable transportation? yes    Dependents/Others who rely on Patient/Caregiver for care: NA    Cognitive:  Education: high school  Reading Level: 12th grade  Reports difficulty with: N/A  Denies difficulty with: reading, writing, seeing, hearing, comprehension, learning, and memory    Infusion Service: patient utilizing? no  Home Health: patient utilizing? no  DME:   patient utilizing? no    Living Will: no  Healthcare Power of : no Pt reports trusting sister with medical decisions as needed  Written LW/HCPA and verbal information presented to patient today.    Insurance: See potential recipient's insurance for donor coverage.    Patient's Insurance: Does potential donor have their own health insurance? Yes  Possible concerns regarding insurance post-donation reviewed. Patient verbalizes clear understanding.    Financial:  Employment: Patient is currently employed. Patient does plan to return to work once medically cleared.   Spouse/Significant Other Employment: pt denies   Patient states does not expect to have any financial problems following transplant surgery.  Patient states has not conducted fundraising to assist with donation costs. SW SUBMITTED NLDAC APPLICATION     Tobacco/Alcohol/Illicit Drug Abuse: Patient reports does use  marijuana  and that patient does plan to remain abstinent pre-surgery    Psychiatric History: patient denies    Coping: Identify Patient & Caregiver Strategies to [place]:   1. Currently & Pre-transplant - family/arianna   2. At the time of organ donation surgery - /family/arianna   3. During post-Organ donation & Recovery Period - family/arianna    Resources, information and support provided. Psychosocial aspects regarding organ donation and transplantation were discussed. Patient reports having a clear understanding of resources, information, support and psychosocial aspects related to organ donation.    Discharge Plan: Patient to discharge to own home under the care of patient's sister post-organ transplant. Patient states that patient's sister will be present as caregiver in the hospital. Patient's sister will transport patient home. Patient states agreement with not driving and not returning to work until medically cleared to do so.    Patient continues to report having a clear understanding of confidentiality, option to not donate, alternative  treatment options, importance of compliance, resources and resource limitations, insurance and insurance insurable limitations, educational information, and the risks involved. Patient understands that the transplant may or may not work, the transplant date/donation date may be cancelled or postponed, and the psychosocial factors involved before, during and after donation.    Patient states having clear understanding and realistic expectations regarding the potential risks and potential benefits of organ transplantation and organ donation. Patient agrees to further discuss with health care team members and support system members, as well as to utilize available resources and express questions and/or concerns. Resources and information provided and reviewed.    Patient denies feelings of coercion, pressure or obligation to donate. Patient states that he is not receiving compensation for organ donation.    Patient reports motivation to pursue organ donation at this time.    Suitability for Donation: At this time, patient presents as a a suitable candidate for organ donation. Patient states does have a caregiver plan, transportation plan, and lodging plan in place. Patient states that patient does have medical and prescription medicine insurance in place and does have a plan in place to afford post-transplant costs.    Patient provided verbal permission to release any necessary information to outside resources for patient care and discharge planning.  Resources and information provided and reviewed.  Patient is choosing local pharmacy.     provided psychosocial support, counseling, resources, education, assistance, and discharge planning.  remains available.    Recommendations/Additional Comments: SW expressed caregiver will need to be present during inpt stay. Pt reports he was told not to bring caregiver.    Patient states is aware of [facility]'s affiliation and/or partnership with agencies in  home health care, LTAC, SNF, DME, and other hospitals and clinics.

## 2024-01-10 NOTE — PROGRESS NOTES
PRE-OP TEACHING NOTE    German Schmid is here today for pre-op appointments.  Pre-op instructions reviewed and written information was provided.  All questions were answered.  Discussed possibility that surgery may be rescheduled if the program is busy with  donor transplants.  Also discussed COVID test done on the morning of surgery.  Patient agreed and verbalized understanding of all instructions.

## 2024-01-10 NOTE — PROGRESS NOTES
Transplant Surgery Kidney Donor Preoperative Evaluation    Subjective:   CC:  Preoperative evaluation before donor nephrectomy.    HPI:  Mr. Schmid is a 48 y.o. year old Black or  male who has been approved to be a living  kidney  donor.  He denies any changes in his health condition since his previous visit.     External provider notes reviewed: No     History reviewed. No pertinent past medical history.  History reviewed. No pertinent surgical history.  Review of patient's allergies indicates:  No Known Allergies  Review of Systems   Constitutional: Negative.  Negative for fatigue and fever.   HENT:  Negative for hearing loss, nosebleeds and sinus pressure.    Eyes:  Negative for photophobia and visual disturbance.   Respiratory:  Negative for cough, shortness of breath, wheezing and stridor.    Cardiovascular:  Negative for chest pain, palpitations and leg swelling.   Gastrointestinal:  Negative for abdominal distention, blood in stool, constipation, diarrhea and nausea.   Endocrine: Negative for polydipsia and polyphagia.   Genitourinary:  Negative for dysuria, flank pain and hematuria.   Musculoskeletal:  Negative for arthralgias, joint swelling and myalgias.   Skin:  Negative for color change and rash.   Neurological:  Negative for dizziness, tremors, seizures, syncope, weakness and numbness.   Hematological:  Negative for adenopathy. Does not bruise/bleed easily.   Psychiatric/Behavioral:  Negative for behavioral problems, confusion, decreased concentration, dysphoric mood and hallucinations.      Objective:   There were no vitals taken for this visit.  Physical Exam  Vitals and nursing note reviewed.   Constitutional:       Appearance: He is well-developed.   HENT:      Head: Normocephalic and atraumatic.      Mouth/Throat:      Pharynx: No oropharyngeal exudate.   Eyes:      General: No scleral icterus.     Pupils: Pupils are equal, round, and reactive to light.   Neck:      Thyroid: No  thyromegaly.      Vascular: No JVD.      Trachea: No tracheal deviation.   Cardiovascular:      Rate and Rhythm: Normal rate and regular rhythm.      Heart sounds: Normal heart sounds.   Pulmonary:      Effort: Pulmonary effort is normal. No respiratory distress.      Breath sounds: Normal breath sounds. No stridor. No wheezing or rales.   Chest:      Chest wall: No tenderness.   Abdominal:      General: Bowel sounds are normal. There is no distension.      Palpations: Abdomen is soft. There is no mass.      Tenderness: There is no abdominal tenderness. There is no rebound.   Musculoskeletal:         General: No tenderness. Normal range of motion.      Cervical back: Normal range of motion.   Lymphadenopathy:      Cervical: No cervical adenopathy.   Skin:     General: Skin is warm and dry.      Findings: No erythema or rash.   Neurological:      Mental Status: He is alert and oriented to person, place, and time.   Psychiatric:         Behavior: Behavior normal.         ABO type: A POS    Diagnostics:  The following labs have been reviewed: CBC  BMP  The following radiology images have been independently reviewed and interpreted: CT Abd/Pelvis    Imaging Studies:  CT angiogram:   Left renal arteries: 1   Right renal arteries: 1   Other important findings: none    Assessment:     1. Transplant donor evaluation        Plan:   Transplant Surgery Donor Candidacy:   Mr. Schmid remains an excellent candidate for kidney donation.    Based on the preoperative evaluation, a left robotic donor nephrectomy is planned.    Additional testing to be completed according to Written Order Guideline for Living Kidney Donor (LD) Evaluation (LDK-02).    Interpretation of tests and discussion of patient management involves all members of the multidisciplinary transplant team.    Dorian Workman MD       Counseling:   I discussed with Mr. Schmid that donation is a voluntary activity and reiterated it should be done willingly and for  altruistic reasons only.  I reviewed that no payment should be received for donating.  I also discussed that coercion, guilt, pressure, or feelings of obligation are not appropriate reasons to donate.  The option to withdraw at any time was emphasized.  Mr. Schmid was reminded that a medical opt out can be given to protect his confidentiality, and no member of the transplant team will discuss specifics of his health or medical/social history with anyone else without permission.  The need for lifelong routine medical follow-up for optimal health, including routine health maintenance was reviewed.    Patient advised that it is recommended that all transplant candidates, and their close contacts and household members receive Covid vaccination.    I discussed the risks related to surgery including the risks related to anesthesia, bleeding, infection, inability for surgery to be performed laparoscopically, risks of reoperation as well as the risks of death.  We discussed the length of hospitalization, return to work times, as well as follow-up post-donation.    I also discussed the slight possibility that due to problems with the recipient operation, the transplant might not be able to be completed after the organ was already removed. If such a situation should arise, the donor prefers that the organ be transplanted into a suitable third-party recipient.

## 2024-01-12 ENCOUNTER — SOCIAL WORK (OUTPATIENT)
Dept: TRANSPLANT | Facility: CLINIC | Age: 49
End: 2024-01-12
Payer: COMMERCIAL

## 2024-01-12 LAB
HEPATITIS B CORE  AB, DONOR EVAL, (BLOOD CENTER): NORMAL
HEPATITIS B SURFACE AG, DONOR EVAL, (BLOOD CENTER): NORMAL
HEPATITIS C ANTIBODY,DONOR EVAL (BLOOD CENTER): NORMAL
HIV1/2 AG/AB, DONOR EVAL (BLOOD CENTER): NORMAL
NAT PANEL, DONOR EVAL (BLOOD CENTER): NORMAL

## 2024-01-12 NOTE — PROGRESS NOTES
[facility] Approval -Reference Number: [id number]      SW received conformation from the [facility] reporting approval of application. Pt has been approved for travel and wage reimbursement.     Travel Reimbursement:$2,080  Wage Reimbursement:$466.50    Funding has been made available by a federal jack from the [employer]. This financial support is based on availability of funds and is not guaranteed.    SW remains available to pt and family.

## 2024-01-17 ENCOUNTER — DOCUMENTATION ONLY (OUTPATIENT)
Dept: TRANSPLANT | Facility: CLINIC | Age: 49
End: 2024-01-17
Payer: COMMERCIAL

## 2024-01-17 ENCOUNTER — TELEPHONE (OUTPATIENT)
Dept: TRANSPLANT | Facility: CLINIC | Age: 49
End: 2024-01-17
Payer: COMMERCIAL

## 2024-01-17 NOTE — TELEPHONE ENCOUNTER
ABO x 2 verified with Epic and [employer].    ----- Message from Raina Johnson RN sent at 1/17/2024 10:58 AM CST -----  Please verify ABO!

## 2024-01-22 ENCOUNTER — HOSPITAL ENCOUNTER (INPATIENT)
Facility: HOSPITAL | Age: 49
LOS: 2 days | Discharge: HOME | DRG: 660 | End: 2024-01-24
Attending: TRANSPLANT SURGERY | Admitting: TRANSPLANT SURGERY
Payer: COMMERCIAL

## 2024-01-22 ENCOUNTER — ANESTHESIA (OUTPATIENT)
Dept: SURGERY | Facility: HOSPITAL | Age: 49
DRG: 660 | End: 2024-01-22
Payer: COMMERCIAL

## 2024-01-22 DIAGNOSIS — Z90.5 HISTORY OF KIDNEY DONATION: ICD-10-CM

## 2024-01-22 DIAGNOSIS — Z52.4 KIDNEY DONOR: Primary | ICD-10-CM

## 2024-01-22 DIAGNOSIS — D62 ACUTE BLOOD LOSS ANEMIA: ICD-10-CM

## 2024-01-22 DIAGNOSIS — Z00.5 TRANSPLANT DONOR EVALUATION: ICD-10-CM

## 2024-01-22 LAB
ABO + RH BLD: NORMAL
BLD GP AB SCN CELLS X3 SERPL QL: NORMAL
HCT VFR BLD AUTO: 37.6 % (ref 40–54)
SARS-COV-2 RDRP RESP QL NAA+PROBE: NEGATIVE

## 2024-01-22 PROCEDURE — 63600175 PHARM REV CODE 636 W HCPCS: Performed by: STUDENT IN AN ORGANIZED HEALTH CARE EDUCATION/TRAINING PROGRAM

## 2024-01-22 PROCEDURE — 8E0W4CZ ROBOTIC ASSISTED PROCEDURE OF TRUNK REGION, PERCUTANEOUS ENDOSCOPIC APPROACH: ICD-10-PCS | Performed by: TRANSPLANT SURGERY

## 2024-01-22 PROCEDURE — 36000712 HC OR TIME LEV V 1ST 15 MIN: Performed by: TRANSPLANT SURGERY

## 2024-01-22 PROCEDURE — 94761 N-INVAS EAR/PLS OXIMETRY MLT: CPT

## 2024-01-22 PROCEDURE — 27000221 HC OXYGEN, UP TO 24 HOURS

## 2024-01-22 PROCEDURE — 25000003 PHARM REV CODE 250: Performed by: NURSE ANESTHETIST, CERTIFIED REGISTERED

## 2024-01-22 PROCEDURE — D9220A PRA ANESTHESIA: Mod: ANES,,, | Performed by: ANESTHESIOLOGY

## 2024-01-22 PROCEDURE — 25000003 PHARM REV CODE 250: Performed by: ANESTHESIOLOGY

## 2024-01-22 PROCEDURE — 27201423 OPTIME MED/SURG SUP & DEVICES STERILE SUPPLY: Performed by: TRANSPLANT SURGERY

## 2024-01-22 PROCEDURE — 36000713 HC OR TIME LEV V EA ADD 15 MIN: Performed by: TRANSPLANT SURGERY

## 2024-01-22 PROCEDURE — 71000016 HC POSTOP RECOV ADDL HR: Performed by: TRANSPLANT SURGERY

## 2024-01-22 PROCEDURE — 37000008 HC ANESTHESIA 1ST 15 MINUTES: Performed by: TRANSPLANT SURGERY

## 2024-01-22 PROCEDURE — 50547 LAPARO REMOVAL DONOR KIDNEY: CPT | Mod: LT,,, | Performed by: TRANSPLANT SURGERY

## 2024-01-22 PROCEDURE — 86850 RBC ANTIBODY SCREEN: CPT | Performed by: TRANSPLANT SURGERY

## 2024-01-22 PROCEDURE — 25000003 PHARM REV CODE 250: Performed by: STUDENT IN AN ORGANIZED HEALTH CARE EDUCATION/TRAINING PROGRAM

## 2024-01-22 PROCEDURE — 71000015 HC POSTOP RECOV 1ST HR: Performed by: TRANSPLANT SURGERY

## 2024-01-22 PROCEDURE — U0002 COVID-19 LAB TEST NON-CDC: HCPCS | Performed by: TRANSPLANT SURGERY

## 2024-01-22 PROCEDURE — 37000009 HC ANESTHESIA EA ADD 15 MINS: Performed by: TRANSPLANT SURGERY

## 2024-01-22 PROCEDURE — 63600175 PHARM REV CODE 636 W HCPCS: Performed by: NURSE ANESTHETIST, CERTIFIED REGISTERED

## 2024-01-22 PROCEDURE — D9220A PRA ANESTHESIA: Mod: CRNA,,, | Performed by: NURSE ANESTHETIST, CERTIFIED REGISTERED

## 2024-01-22 PROCEDURE — 50547 LAPARO REMOVAL DONOR KIDNEY: CPT | Mod: 82,LT,, | Performed by: SURGERY

## 2024-01-22 PROCEDURE — 25000003 PHARM REV CODE 250: Mod: NTX | Performed by: TRANSPLANT SURGERY

## 2024-01-22 PROCEDURE — 20600001 HC STEP DOWN PRIVATE ROOM

## 2024-01-22 PROCEDURE — 71000033 HC RECOVERY, INTIAL HOUR: Performed by: TRANSPLANT SURGERY

## 2024-01-22 PROCEDURE — 63600175 PHARM REV CODE 636 W HCPCS: Mod: JZ,JG | Performed by: TRANSPLANT SURGERY

## 2024-01-22 PROCEDURE — S5010 5% DEXTROSE AND 0.45% SALINE: HCPCS | Performed by: STUDENT IN AN ORGANIZED HEALTH CARE EDUCATION/TRAINING PROGRAM

## 2024-01-22 PROCEDURE — 0TT14ZZ RESECTION OF LEFT KIDNEY, PERCUTANEOUS ENDOSCOPIC APPROACH: ICD-10-PCS | Performed by: TRANSPLANT SURGERY

## 2024-01-22 PROCEDURE — 63600175 PHARM REV CODE 636 W HCPCS: Mod: NTX | Performed by: TRANSPLANT SURGERY

## 2024-01-22 PROCEDURE — 85014 HEMATOCRIT: CPT | Performed by: STUDENT IN AN ORGANIZED HEALTH CARE EDUCATION/TRAINING PROGRAM

## 2024-01-22 RX ORDER — OXYCODONE HYDROCHLORIDE 5 MG/1
5 TABLET ORAL
Status: DISCONTINUED | OUTPATIENT
Start: 2024-01-22 | End: 2024-01-22 | Stop reason: HOSPADM

## 2024-01-22 RX ORDER — MIDAZOLAM HYDROCHLORIDE 1 MG/ML
INJECTION, SOLUTION INTRAMUSCULAR; INTRAVENOUS
Status: DISCONTINUED | OUTPATIENT
Start: 2024-01-22 | End: 2024-01-22

## 2024-01-22 RX ORDER — BUPIVACAINE HYDROCHLORIDE 2.5 MG/ML
INJECTION, SOLUTION EPIDURAL; INFILTRATION; INTRACAUDAL
Status: DISCONTINUED | OUTPATIENT
Start: 2024-01-22 | End: 2024-01-22

## 2024-01-22 RX ORDER — HALOPERIDOL 5 MG/ML
0.5 INJECTION INTRAMUSCULAR EVERY 10 MIN PRN
Status: DISCONTINUED | OUTPATIENT
Start: 2024-01-22 | End: 2024-01-22 | Stop reason: HOSPADM

## 2024-01-22 RX ORDER — HEPARIN SODIUM 5000 [USP'U]/ML
5000 INJECTION, SOLUTION INTRAVENOUS; SUBCUTANEOUS
Status: COMPLETED | OUTPATIENT
Start: 2024-01-22 | End: 2024-01-22

## 2024-01-22 RX ORDER — ROCURONIUM BROMIDE 10 MG/ML
INJECTION, SOLUTION INTRAVENOUS
Status: DISCONTINUED | OUTPATIENT
Start: 2024-01-22 | End: 2024-01-22

## 2024-01-22 RX ORDER — FUROSEMIDE 10 MG/ML
INJECTION INTRAMUSCULAR; INTRAVENOUS
Status: DISCONTINUED | OUTPATIENT
Start: 2024-01-22 | End: 2024-01-22

## 2024-01-22 RX ORDER — ONDANSETRON HYDROCHLORIDE 2 MG/ML
INJECTION, SOLUTION INTRAVENOUS
Status: DISCONTINUED | OUTPATIENT
Start: 2024-01-22 | End: 2024-01-22

## 2024-01-22 RX ORDER — OXYCODONE AND ACETAMINOPHEN 10; 325 MG/1; MG/1
1 TABLET ORAL EVERY 4 HOURS PRN
Status: DISCONTINUED | OUTPATIENT
Start: 2024-01-22 | End: 2024-01-24 | Stop reason: HOSPADM

## 2024-01-22 RX ORDER — KETOROLAC TROMETHAMINE 30 MG/ML
15 INJECTION, SOLUTION INTRAMUSCULAR; INTRAVENOUS EVERY 6 HOURS
Status: COMPLETED | OUTPATIENT
Start: 2024-01-22 | End: 2024-01-24

## 2024-01-22 RX ORDER — DEXAMETHASONE SODIUM PHOSPHATE 4 MG/ML
INJECTION, SOLUTION INTRA-ARTICULAR; INTRALESIONAL; INTRAMUSCULAR; INTRAVENOUS; SOFT TISSUE
Status: DISCONTINUED | OUTPATIENT
Start: 2024-01-22 | End: 2024-01-22

## 2024-01-22 RX ORDER — VECURONIUM BROMIDE FOR INJECTION 1 MG/ML
INJECTION, POWDER, LYOPHILIZED, FOR SOLUTION INTRAVENOUS
Status: DISCONTINUED | OUTPATIENT
Start: 2024-01-22 | End: 2024-01-22

## 2024-01-22 RX ORDER — HYDROMORPHONE HYDROCHLORIDE 1 MG/ML
0.2 INJECTION, SOLUTION INTRAMUSCULAR; INTRAVENOUS; SUBCUTANEOUS EVERY 5 MIN PRN
Status: DISCONTINUED | OUTPATIENT
Start: 2024-01-22 | End: 2024-01-22 | Stop reason: HOSPADM

## 2024-01-22 RX ORDER — OXYCODONE AND ACETAMINOPHEN 5; 325 MG/1; MG/1
1 TABLET ORAL EVERY 4 HOURS PRN
Status: DISCONTINUED | OUTPATIENT
Start: 2024-01-22 | End: 2024-01-24 | Stop reason: HOSPADM

## 2024-01-22 RX ORDER — PROPOFOL 10 MG/ML
VIAL (ML) INTRAVENOUS
Status: DISCONTINUED | OUTPATIENT
Start: 2024-01-22 | End: 2024-01-22

## 2024-01-22 RX ORDER — FENTANYL CITRATE 50 UG/ML
INJECTION, SOLUTION INTRAMUSCULAR; INTRAVENOUS
Status: DISCONTINUED | OUTPATIENT
Start: 2024-01-22 | End: 2024-01-22

## 2024-01-22 RX ORDER — PHENYLEPHRINE HYDROCHLORIDE 10 MG/ML
INJECTION INTRAVENOUS
Status: DISCONTINUED | OUTPATIENT
Start: 2024-01-22 | End: 2024-01-22

## 2024-01-22 RX ORDER — HEPARIN SODIUM 5000 [USP'U]/ML
5000 INJECTION, SOLUTION INTRAVENOUS; SUBCUTANEOUS EVERY 8 HOURS
Status: DISCONTINUED | OUTPATIENT
Start: 2024-01-22 | End: 2024-01-24 | Stop reason: HOSPADM

## 2024-01-22 RX ORDER — KETAMINE HCL IN 0.9 % NACL 50 MG/5 ML
SYRINGE (ML) INTRAVENOUS
Status: DISCONTINUED | OUTPATIENT
Start: 2024-01-22 | End: 2024-01-22

## 2024-01-22 RX ORDER — HYDROMORPHONE HYDROCHLORIDE 1 MG/ML
INJECTION, SOLUTION INTRAMUSCULAR; INTRAVENOUS; SUBCUTANEOUS
Status: DISCONTINUED | OUTPATIENT
Start: 2024-01-22 | End: 2024-01-22

## 2024-01-22 RX ORDER — DEXMEDETOMIDINE HYDROCHLORIDE 100 UG/ML
INJECTION, SOLUTION INTRAVENOUS
Status: DISCONTINUED | OUTPATIENT
Start: 2024-01-22 | End: 2024-01-22

## 2024-01-22 RX ORDER — ACETAMINOPHEN 10 MG/ML
INJECTION, SOLUTION INTRAVENOUS
Status: DISCONTINUED | OUTPATIENT
Start: 2024-01-22 | End: 2024-01-22

## 2024-01-22 RX ORDER — DEXTROSE MONOHYDRATE AND SODIUM CHLORIDE 5; .45 G/100ML; G/100ML
INJECTION, SOLUTION INTRAVENOUS CONTINUOUS
Status: DISCONTINUED | OUTPATIENT
Start: 2024-01-22 | End: 2024-01-23

## 2024-01-22 RX ORDER — ONDANSETRON HYDROCHLORIDE 2 MG/ML
4 INJECTION, SOLUTION INTRAVENOUS EVERY 8 HOURS PRN
Status: DISCONTINUED | OUTPATIENT
Start: 2024-01-22 | End: 2024-01-24 | Stop reason: HOSPADM

## 2024-01-22 RX ORDER — LIDOCAINE HYDROCHLORIDE 20 MG/ML
INJECTION INTRAVENOUS
Status: DISCONTINUED | OUTPATIENT
Start: 2024-01-22 | End: 2024-01-22

## 2024-01-22 RX ORDER — DOCUSATE SODIUM 100 MG/1
100 CAPSULE, LIQUID FILLED ORAL 2 TIMES DAILY
Status: DISCONTINUED | OUTPATIENT
Start: 2024-01-22 | End: 2024-01-24 | Stop reason: HOSPADM

## 2024-01-22 RX ADMIN — CEFAZOLIN 2 G: 2 INJECTION, POWDER, FOR SOLUTION INTRAMUSCULAR; INTRAVENOUS at 01:01

## 2024-01-22 RX ADMIN — SODIUM CHLORIDE, SODIUM GLUCONATE, SODIUM ACETATE, POTASSIUM CHLORIDE, MAGNESIUM CHLORIDE, SODIUM PHOSPHATE, DIBASIC, AND POTASSIUM PHOSPHATE: .53; .5; .37; .037; .03; .012; .00082 INJECTION, SOLUTION INTRAVENOUS at 02:01

## 2024-01-22 RX ADMIN — HEPARIN SODIUM 5000 UNITS: 5000 INJECTION INTRAVENOUS; SUBCUTANEOUS at 10:01

## 2024-01-22 RX ADMIN — ROCURONIUM BROMIDE 10 MG: 10 INJECTION, SOLUTION INTRAVENOUS at 04:01

## 2024-01-22 RX ADMIN — KETOROLAC TROMETHAMINE 15 MG: 30 INJECTION, SOLUTION INTRAMUSCULAR; INTRAVENOUS at 06:01

## 2024-01-22 RX ADMIN — SODIUM CHLORIDE: 0.9 INJECTION, SOLUTION INTRAVENOUS at 10:01

## 2024-01-22 RX ADMIN — FENTANYL CITRATE 50 MCG: 50 INJECTION, SOLUTION INTRAMUSCULAR; INTRAVENOUS at 05:01

## 2024-01-22 RX ADMIN — FENTANYL CITRATE 50 MCG: 50 INJECTION, SOLUTION INTRAMUSCULAR; INTRAVENOUS at 01:01

## 2024-01-22 RX ADMIN — DOCUSATE SODIUM 100 MG: 100 CAPSULE, LIQUID FILLED ORAL at 10:01

## 2024-01-22 RX ADMIN — MIDAZOLAM HYDROCHLORIDE 2 MG: 1 INJECTION, SOLUTION INTRAMUSCULAR; INTRAVENOUS at 12:01

## 2024-01-22 RX ADMIN — ROCURONIUM BROMIDE 50 MG: 10 INJECTION, SOLUTION INTRAVENOUS at 12:01

## 2024-01-22 RX ADMIN — OXYCODONE 5 MG: 5 TABLET ORAL at 07:01

## 2024-01-22 RX ADMIN — CEFAZOLIN 2 G: 2 INJECTION, POWDER, FOR SOLUTION INTRAMUSCULAR; INTRAVENOUS at 05:01

## 2024-01-22 RX ADMIN — HYDROMORPHONE HYDROCHLORIDE 1 MG: 1 INJECTION, SOLUTION INTRAMUSCULAR; INTRAVENOUS; SUBCUTANEOUS at 05:01

## 2024-01-22 RX ADMIN — ROCURONIUM BROMIDE 20 MG: 10 INJECTION, SOLUTION INTRAVENOUS at 03:01

## 2024-01-22 RX ADMIN — PHENYLEPHRINE HYDROCHLORIDE 100 MCG: 10 INJECTION INTRAVENOUS at 01:01

## 2024-01-22 RX ADMIN — SODIUM CHLORIDE, SODIUM GLUCONATE, SODIUM ACETATE, POTASSIUM CHLORIDE, MAGNESIUM CHLORIDE, SODIUM PHOSPHATE, DIBASIC, AND POTASSIUM PHOSPHATE: .53; .5; .37; .037; .03; .012; .00082 INJECTION, SOLUTION INTRAVENOUS at 01:01

## 2024-01-22 RX ADMIN — LIDOCAINE HYDROCHLORIDE 100 MG: 20 INJECTION INTRAVENOUS at 12:01

## 2024-01-22 RX ADMIN — Medication 30 MG: at 01:01

## 2024-01-22 RX ADMIN — ACETAMINOPHEN 1000 MG: 10 INJECTION, SOLUTION INTRAVENOUS at 02:01

## 2024-01-22 RX ADMIN — HYDROMORPHONE HYDROCHLORIDE 1 MG: 1 INJECTION, SOLUTION INTRAMUSCULAR; INTRAVENOUS; SUBCUTANEOUS at 02:01

## 2024-01-22 RX ADMIN — DEXMEDETOMIDINE 16 MCG: 100 INJECTION, SOLUTION, CONCENTRATE INTRAVENOUS at 04:01

## 2024-01-22 RX ADMIN — DEXMEDETOMIDINE 8 MCG: 100 INJECTION, SOLUTION, CONCENTRATE INTRAVENOUS at 05:01

## 2024-01-22 RX ADMIN — ROCURONIUM BROMIDE 20 MG: 10 INJECTION, SOLUTION INTRAVENOUS at 02:01

## 2024-01-22 RX ADMIN — DEXAMETHASONE SODIUM PHOSPHATE 8 MG: 4 INJECTION, SOLUTION INTRAMUSCULAR; INTRAVENOUS at 01:01

## 2024-01-22 RX ADMIN — VECURONIUM BROMIDE 10 MG: 10 INJECTION, POWDER, LYOPHILIZED, FOR SOLUTION INTRAVENOUS at 01:01

## 2024-01-22 RX ADMIN — PROPOFOL 200 MG: 10 INJECTION, EMULSION INTRAVENOUS at 12:01

## 2024-01-22 RX ADMIN — FENTANYL CITRATE 50 MCG: 50 INJECTION, SOLUTION INTRAMUSCULAR; INTRAVENOUS at 12:01

## 2024-01-22 RX ADMIN — SUGAMMADEX 400 MG: 100 INJECTION, SOLUTION INTRAVENOUS at 05:01

## 2024-01-22 RX ADMIN — ONDANSETRON 4 MG: 2 INJECTION INTRAMUSCULAR; INTRAVENOUS at 05:01

## 2024-01-22 RX ADMIN — FUROSEMIDE 10 MG: 10 INJECTION, SOLUTION INTRAMUSCULAR; INTRAVENOUS at 04:01

## 2024-01-22 RX ADMIN — ROCURONIUM BROMIDE 10 MG: 10 INJECTION, SOLUTION INTRAVENOUS at 05:01

## 2024-01-22 RX ADMIN — Medication 20 MG: at 02:01

## 2024-01-22 RX ADMIN — DEXTROSE AND SODIUM CHLORIDE: 5; 450 INJECTION, SOLUTION INTRAVENOUS at 06:01

## 2024-01-22 RX ADMIN — SODIUM CHLORIDE, SODIUM GLUCONATE, SODIUM ACETATE, POTASSIUM CHLORIDE, MAGNESIUM CHLORIDE, SODIUM PHOSPHATE, DIBASIC, AND POTASSIUM PHOSPHATE: .53; .5; .37; .037; .03; .012; .00082 INJECTION, SOLUTION INTRAVENOUS at 05:01

## 2024-01-22 NOTE — ANESTHESIA PROCEDURE NOTES
Intubation    Date/Time: 1/22/2024 12:45 PM    Performed by: Randall Aguilar CRNA  Authorized by: Azael Izaguirre MD    Intubation:     Induction:  Intravenous    Intubated:  Postinduction    Mask Ventilation:  Easy with oral airway    Attempts:  1    Attempted By:  CRNA    Method of Intubation:  Video laryngoscopy    Blade:  [place] 4    Laryngeal View Grade: Grade I - full view of cords      Difficult Airway Encountered?: No      Complications:  None    Airway Device:  Oral endotracheal tube    Airway Device Size:  7.5    Style/Cuff Inflation:  Cuffed (inflated to minimal occlusive pressure)    Inflation Amount (mL):  8    Tube secured:  23    Secured at:  The lips    Placement Verified By:  Capnometry    Complicating Factors:  None    Findings Post-Intubation:  BS equal bilateral and atraumatic/condition of teeth unchanged

## 2024-01-22 NOTE — OP NOTE
Operative Report    Date of Procedure: 1/22/2024    Surgeons:  Surgeon(s) and Role:     * Augustine Cabrera MD - Primary     * Fernando Benton MD - Assisting     * Azael Dozier MD - Fellow    First Assistant Attestation:  The presence of an additional attending surgeon functioning as first assistant was required due to the complexity of the procedure relative to any available residents. I certify that no resident was available who was qualified to serve as first assistant. Duties performed by the assistant included assisting the primary surgeon.    Pre-operative Diagnosis: Living Kidney Donor  Post-operative Diagnosis: Same  Procedure(s) Performed:   Left Kidney  robotic donor nephrectomy    Anesthesia: General endotracheal    Preamble  Indications and Patient Counseling: The patient is a 49 y.o. year-old male who has been evaluated as a living kidney donor.  A left  robotic nephrectomy is planned.  The patient has been thoroughly informed regarding the risks of the procedure, including death, serious surgical complications, bleeding, and reoperation.  He has also been informed of the possible need for conversion to open surgery.  He is aware that kidney donation is completely voluntary, and denies any coercion or motive for donating other than a sincere desire to benefit the recipient.  The patient voices understanding and agrees to proceed with the planned procedure.    ABO Confirmation: Prior to proceeding with donor nephrectomy, a formal ABO confirmation was done according to hospital and UNOS policies.  I confirmed the UNOS ID number of the donor organ and the donor and recipient ABO types.  This confirmation was personally done by an attending surgeon and circulating nurse, and is officially documented elsewhere.    Time-Out: A complete time out was carried out prior to incision, with confirmation of patient identity, correct procedure, correct operative site, appropriate antibiotic prophylaxis,  review of any known allergies, and presence of all needed equipment.    Procedure in Detail  Following the induction of general endotracheal anesthesia, the patient was positioned in a right lateral decubitus position with the table flexed.  The abdomen and left flank were sterilely prepped and draped.  A balloon-tipped 12 mm laparoscopic port was introduced just below the umbilicus using an open Pedro technique.  The abdomen was then insufflated to 15 mmHg pressure.  Three additional ports were then placed consisting of a 12 mm port to the left of the umbilicus, an 8 mm da Maikel port just below the left costal margin near the mid axillary line and another 8 mm da Maikel port just medial to the anterior superior iliac spine.  The da Maikel robotic system was then docked to these ports.  Dissection was then carried out using the da Maikel system.  The colon was mobilized down to the pelvic brim.  Gerota's fascia was then opened and the left renal vein was identified.  Lumbar (x3), gonadal (x2), and adrenal tributaries were clipped and divided as appropriate.  The left renal artery was identified as proximally as feasible.  The adrenal gland was dissected away from the superior pole of the kidney.  The ureter was swept upwards off of the psoas muscle with care taken to avoid traumatizing it.  The posterior and lateral attachments of the kidney were then taken down.  The adrenal gland was adherent to the upper pole of the kidney. It dissection was difficult and bloody due to some adrenal gland tears. The bleeding was controled with packing. An appropriately-sized vertical incision was created just below the umbilicus for extraction of the kidney.  This was extended just large enough to admit the surgeon's hand.  The Gelport device was placed at this point.  The abdomen was re-insufflated, and the kidney was inspected with conventional laparoscopy to ensure that all non-vascular attachments hand been taken down.   Additional cautery dissection was done as needed.  The patient was given 10 mg of Lasix to ensure a brisk diuresis.  The ureter was then divided distally using a vascular Endo-ROSMERY stapler.  After this was done, the  renal artery was divided with a vascular stapler followed by the renal vein.  The kidney was then removed through the Gelport and immediately placed on ice and flushed with ice cold UW solution. Attention was then directed to the operative field.  The operative field was thoroughly irrigated and assessed for hemostasis. The adrenal gland was carefully evaluated. Minimal oozing was observing. Those areas were covered with [place] with successful hemostasis. The port sites were assessed for size of the fascial defect, and external suturing or a specifically designed closure product was used as appropriate.  The kidney extraction incision was closed with continuous #1 PDS.  All incisions were infiltrated with bupivicaine. Skin incisions were closed with 4-0 subcuticular Monocryl.  The patient was then taken from the Operating Room in satisfactory condition.  Prior to the conclusion of the procedure, I was told that all sponge, needle and instrument counts were correct.      Confirmation of Intended Recipient: Prior to the kidney leaving the donor operating room, the correct intended recipient was verified by the attending surgeon and the circulating nurse.      Estimated Blood Loss: 1.69 fl. oz. mL  Drains: None  Specimens: none    Findings:  Healthy-appearing kidney  Arterial anatomy: Single  Venous anatomy: Single  Ureteral anatomy: Single      Times:  Console start:  1/22/2024  1:29 PM  Console finish: 1/22/2024  4:14 PM  Artery divided:  1/22/2024  4:26 PM  Kidney on ice:  1/22/2024  4:27 PM  Flush begin:  1/22/2024  4:28 PM  Flush end:  1/22/2024  4:30 PM  Kidney out of room: 1/22/2024  4:48 PM

## 2024-01-22 NOTE — ANESTHESIA PREPROCEDURE EVALUATION
01/22/2024  German Schmid is a 49 y.o., male.      Pre-op Assessment    I have reviewed the Patient Summary Reports.          Review of Systems  Anesthesia Hx:  No problems with previous Anesthesia                Social:  Recreational Drugs, Smoker       Hematology/Oncology:  Hematology Normal   Oncology Normal                                   EENT/Dental:  EENT/Dental Normal           Cardiovascular:  Cardiovascular Normal                                            Pulmonary:        Sleep Apnea                Renal/:  Renal/ Normal                 Hepatic/GI:  Hepatic/GI Normal                 Musculoskeletal:  Musculoskeletal Normal                Neurological:  Neurology Normal                                      Endocrine:  Endocrine Normal            Dermatological:  Skin Normal    Psych:  Psychiatric Normal                    Physical Exam  General: Alert and Oriented    Airway:  Mallampati: II / II  Mouth Opening: Normal  TM Distance: Normal  Tongue: Normal  Neck ROM: Normal ROM    Dental:  Intact    Chest/Lungs:  Clear to auscultation, Normal Respiratory Rate    Heart:  Rate: Normal  Rhythm: Regular Rhythm  Sounds: Normal        Anesthesia Plan  Type of Anesthesia, risks & benefits discussed:    Anesthesia Type: Gen ETT  Intra-op Monitoring Plan: Standard ASA Monitors  Post Op Pain Control Plan: multimodal analgesia  Airway Plan: Direct  Informed Consent: Informed consent signed with the Patient and all parties understand the risks and agree with anesthesia plan.  All questions answered.   ASA Score: 3    Ready For Surgery From Anesthesia Perspective.     .

## 2024-01-23 PROBLEM — D62 ACUTE BLOOD LOSS ANEMIA: Status: ACTIVE | Noted: 2024-01-23

## 2024-01-23 PROBLEM — Z90.5 HISTORY OF KIDNEY DONATION: Status: ACTIVE | Noted: 2024-01-23

## 2024-01-23 LAB
ALBUMIN SERPL BCP-MCNC: 3.4 G/DL (ref 3.5–5.2)
ANION GAP SERPL CALC-SCNC: 7 MMOL/L (ref 8–16)
BASOPHILS # BLD AUTO: 0 K/UL (ref 0–0.2)
BASOPHILS # BLD AUTO: 0.01 K/UL (ref 0–0.2)
BASOPHILS NFR BLD: 0 % (ref 0–1.9)
BASOPHILS NFR BLD: 0.1 % (ref 0–1.9)
BUN SERPL-MCNC: 13 MG/DL (ref 6–20)
CALCIUM SERPL-MCNC: 8.3 MG/DL (ref 8.7–10.5)
CHLORIDE SERPL-SCNC: 105 MMOL/L (ref 95–110)
CO2 SERPL-SCNC: 26 MMOL/L (ref 23–29)
CREAT SERPL-MCNC: 2.2 MG/DL (ref 0.5–1.4)
DIFFERENTIAL METHOD BLD: ABNORMAL
DIFFERENTIAL METHOD BLD: ABNORMAL
EOSINOPHIL # BLD AUTO: 0 K/UL (ref 0–0.5)
EOSINOPHIL # BLD AUTO: 0 K/UL (ref 0–0.5)
EOSINOPHIL NFR BLD: 0 % (ref 0–8)
EOSINOPHIL NFR BLD: 0 % (ref 0–8)
ERYTHROCYTE [DISTWIDTH] IN BLOOD BY AUTOMATED COUNT: 11.9 % (ref 11.5–14.5)
ERYTHROCYTE [DISTWIDTH] IN BLOOD BY AUTOMATED COUNT: 12 % (ref 11.5–14.5)
EST. GFR  (NO RACE VARIABLE): 35.4 ML/MIN/1.73 M^2
GLUCOSE SERPL-MCNC: 111 MG/DL (ref 70–110)
HCT VFR BLD AUTO: 38.6 % (ref 40–54)
HCT VFR BLD AUTO: 39.6 % (ref 40–54)
HGB BLD-MCNC: 12.5 G/DL (ref 14–18)
HGB BLD-MCNC: 12.8 G/DL (ref 14–18)
IMM GRANULOCYTES # BLD AUTO: 0.04 K/UL (ref 0–0.04)
IMM GRANULOCYTES # BLD AUTO: 0.06 K/UL (ref 0–0.04)
IMM GRANULOCYTES NFR BLD AUTO: 0.4 % (ref 0–0.5)
IMM GRANULOCYTES NFR BLD AUTO: 0.5 % (ref 0–0.5)
LYMPHOCYTES # BLD AUTO: 0.6 K/UL (ref 1–4.8)
LYMPHOCYTES # BLD AUTO: 1.3 K/UL (ref 1–4.8)
LYMPHOCYTES NFR BLD: 11 % (ref 18–48)
LYMPHOCYTES NFR BLD: 6 % (ref 18–48)
MAGNESIUM SERPL-MCNC: 2.2 MG/DL (ref 1.6–2.6)
MCH RBC QN AUTO: 30.4 PG (ref 27–31)
MCH RBC QN AUTO: 31.1 PG (ref 27–31)
MCHC RBC AUTO-ENTMCNC: 31.6 G/DL (ref 32–36)
MCHC RBC AUTO-ENTMCNC: 33.2 G/DL (ref 32–36)
MCV RBC AUTO: 94 FL (ref 82–98)
MCV RBC AUTO: 96 FL (ref 82–98)
MONOCYTES # BLD AUTO: 0.7 K/UL (ref 0.3–1)
MONOCYTES # BLD AUTO: 0.8 K/UL (ref 0.3–1)
MONOCYTES NFR BLD: 6.7 % (ref 4–15)
MONOCYTES NFR BLD: 7.1 % (ref 4–15)
NEUTROPHILS # BLD AUTO: 8.5 K/UL (ref 1.8–7.7)
NEUTROPHILS # BLD AUTO: 9.5 K/UL (ref 1.8–7.7)
NEUTROPHILS NFR BLD: 81.3 % (ref 38–73)
NEUTROPHILS NFR BLD: 86.9 % (ref 38–73)
NRBC BLD-RTO: 0 /100 WBC
NRBC BLD-RTO: 0 /100 WBC
PHOSPHATE SERPL-MCNC: 2.9 MG/DL (ref 2.7–4.5)
PLATELET # BLD AUTO: 230 K/UL (ref 150–450)
PLATELET # BLD AUTO: 237 K/UL (ref 150–450)
PMV BLD AUTO: 9.6 FL (ref 9.2–12.9)
PMV BLD AUTO: 9.8 FL (ref 9.2–12.9)
POTASSIUM SERPL-SCNC: 4 MMOL/L (ref 3.5–5.1)
RBC # BLD AUTO: 4.11 M/UL (ref 4.6–6.2)
RBC # BLD AUTO: 4.11 M/UL (ref 4.6–6.2)
SODIUM SERPL-SCNC: 138 MMOL/L (ref 136–145)
WBC # BLD AUTO: 11.62 K/UL (ref 3.9–12.7)
WBC # BLD AUTO: 9.77 K/UL (ref 3.9–12.7)

## 2024-01-23 PROCEDURE — S5010 5% DEXTROSE AND 0.45% SALINE: HCPCS | Performed by: STUDENT IN AN ORGANIZED HEALTH CARE EDUCATION/TRAINING PROGRAM

## 2024-01-23 PROCEDURE — 25000003 PHARM REV CODE 250: Performed by: PHYSICIAN ASSISTANT

## 2024-01-23 PROCEDURE — 85025 COMPLETE CBC W/AUTO DIFF WBC: CPT | Mod: 91 | Performed by: STUDENT IN AN ORGANIZED HEALTH CARE EDUCATION/TRAINING PROGRAM

## 2024-01-23 PROCEDURE — 83735 ASSAY OF MAGNESIUM: CPT | Performed by: PHYSICIAN ASSISTANT

## 2024-01-23 PROCEDURE — 99024 POSTOP FOLLOW-UP VISIT: CPT | Mod: ,,, | Performed by: PHYSICIAN ASSISTANT

## 2024-01-23 PROCEDURE — 85025 COMPLETE CBC W/AUTO DIFF WBC: CPT | Performed by: PHYSICIAN ASSISTANT

## 2024-01-23 PROCEDURE — 20600001 HC STEP DOWN PRIVATE ROOM

## 2024-01-23 PROCEDURE — 80069 RENAL FUNCTION PANEL: CPT | Performed by: STUDENT IN AN ORGANIZED HEALTH CARE EDUCATION/TRAINING PROGRAM

## 2024-01-23 PROCEDURE — 36415 COLL VENOUS BLD VENIPUNCTURE: CPT | Mod: XB | Performed by: STUDENT IN AN ORGANIZED HEALTH CARE EDUCATION/TRAINING PROGRAM

## 2024-01-23 PROCEDURE — 36415 COLL VENOUS BLD VENIPUNCTURE: CPT | Performed by: PHYSICIAN ASSISTANT

## 2024-01-23 PROCEDURE — 63600175 PHARM REV CODE 636 W HCPCS: Performed by: STUDENT IN AN ORGANIZED HEALTH CARE EDUCATION/TRAINING PROGRAM

## 2024-01-23 PROCEDURE — 25000003 PHARM REV CODE 250: Performed by: STUDENT IN AN ORGANIZED HEALTH CARE EDUCATION/TRAINING PROGRAM

## 2024-01-23 RX ORDER — POLYETHYLENE GLYCOL 3350 17 G/17G
17 POWDER, FOR SOLUTION ORAL 2 TIMES DAILY
Status: DISCONTINUED | OUTPATIENT
Start: 2024-01-23 | End: 2024-01-24 | Stop reason: HOSPADM

## 2024-01-23 RX ADMIN — CEFAZOLIN 2 G: 2 INJECTION, POWDER, FOR SOLUTION INTRAMUSCULAR; INTRAVENOUS at 08:01

## 2024-01-23 RX ADMIN — KETOROLAC TROMETHAMINE 15 MG: 30 INJECTION, SOLUTION INTRAMUSCULAR; INTRAVENOUS at 12:01

## 2024-01-23 RX ADMIN — ONDANSETRON 4 MG: 2 INJECTION INTRAMUSCULAR; INTRAVENOUS at 12:01

## 2024-01-23 RX ADMIN — CEFAZOLIN 2 G: 2 INJECTION, POWDER, FOR SOLUTION INTRAMUSCULAR; INTRAVENOUS at 12:01

## 2024-01-23 RX ADMIN — POLYETHYLENE GLYCOL 3350 17 G: 17 POWDER, FOR SOLUTION ORAL at 08:01

## 2024-01-23 RX ADMIN — HEPARIN SODIUM 5000 UNITS: 5000 INJECTION INTRAVENOUS; SUBCUTANEOUS at 10:01

## 2024-01-23 RX ADMIN — POLYETHYLENE GLYCOL 3350 17 G: 17 POWDER, FOR SOLUTION ORAL at 10:01

## 2024-01-23 RX ADMIN — CEFAZOLIN 2 G: 2 INJECTION, POWDER, FOR SOLUTION INTRAMUSCULAR; INTRAVENOUS at 05:01

## 2024-01-23 RX ADMIN — HEPARIN SODIUM 5000 UNITS: 5000 INJECTION INTRAVENOUS; SUBCUTANEOUS at 06:01

## 2024-01-23 RX ADMIN — DOCUSATE SODIUM 100 MG: 100 CAPSULE, LIQUID FILLED ORAL at 10:01

## 2024-01-23 RX ADMIN — DEXTROSE AND SODIUM CHLORIDE: 5; 450 INJECTION, SOLUTION INTRAVENOUS at 12:01

## 2024-01-23 RX ADMIN — DOCUSATE SODIUM 100 MG: 100 CAPSULE, LIQUID FILLED ORAL at 08:01

## 2024-01-23 RX ADMIN — HEPARIN SODIUM 5000 UNITS: 5000 INJECTION INTRAVENOUS; SUBCUTANEOUS at 02:01

## 2024-01-23 RX ADMIN — KETOROLAC TROMETHAMINE 15 MG: 30 INJECTION, SOLUTION INTRAMUSCULAR; INTRAVENOUS at 06:01

## 2024-01-23 NOTE — PLAN OF CARE
Patient AAO X 4, VSS. Denies pain, N/V. D5 1/2 NS going at 125cc/hr. Laparoscopic sites and midline incision with dermabond DANYELLE. Salvador in place with clear yellow urine. Will D/C this AM. IVPB antibiotics administered. SCD with rekha schulze on patient. Bed locked at lowest setting, yellow socks on, call bell in reach. Remains free from falls.

## 2024-01-23 NOTE — TRANSFER OF CARE
"Anesthesia Transfer of Care Note    Patient: German Schmid    Procedure(s) Performed: Procedure(s) (LRB):  XI ROBOTIC NEPHRECTOMY,DONOR (Left)    Patient location: PACU    Anesthesia Type: general    Transport from OR: Transported from OR on 6-10 L/min O2 by face mask with adequate spontaneous ventilation    Post pain: adequate analgesia    Post assessment: no apparent anesthetic complications and tolerated procedure well    Post vital signs: stable    Level of consciousness: awake    Nausea/Vomiting: no nausea/vomiting    Complications: none    Transfer of care protocol was followed      Last vitals: Visit Vitals  /71 (BP Location: Left arm, Patient Position: Lying)   Pulse 79   Temp 36.7 °C (98.1 °F) (Temporal)   Resp 20   Ht 5' 11" (1.803 m)   Wt 84.2 kg (185 lb 10 oz)   SpO2 97%   BMI 25.89 kg/m²     "

## 2024-01-23 NOTE — ANESTHESIA POSTPROCEDURE EVALUATION
Anesthesia Post Evaluation    Patient: German Schmid    Procedure(s) Performed: Procedure(s) (LRB):  XI ROBOTIC NEPHRECTOMY,DONOR (Left)    Final Anesthesia Type: general      Patient location during evaluation: PACU  Patient participation: Yes- Able to Participate  Level of consciousness: awake and alert  Post-procedure vital signs: reviewed and stable  Pain management: adequate  Airway patency: patent    PONV status at discharge: No PONV  Anesthetic complications: no      Cardiovascular status: blood pressure returned to baseline  Respiratory status: unassisted  Follow-up not needed.              Vitals Value Taken Time   /70 01/23/24 1121   Temp 36.9 °C (98.4 °F) 01/23/24 1121   Pulse 76 01/23/24 1121   Resp 18 01/23/24 1121   SpO2 98 % 01/23/24 1121         Event Time   Out of Recovery 18:45:00         Pain/Sandoval Score: Pain Rating Prior to Med Admin: 8 (1/23/2024 12:36 PM)  Pain Rating Post Med Admin: 5 (1/23/2024  1:06 PM)  Sandoval Score: 9 (1/22/2024  6:45 PM)

## 2024-01-23 NOTE — PROGRESS NOTES
[facility] - Transplant Stepdown  Kidney Transplant  Progress Note      Reason for Follow-up: Reassessment of  recipient and management of immunosuppression.       Subjective:   History of Present Illness:  Mr. Schmid is a 48 y.o. year old Black or  male who has been approved to be a living  kidney  donor. He denies any changes in his health condition since his previous visit.     Mr. Schmid is a 49 y.o. year old male who is status post .    His maintenance immunosuppression consists of:   Immunosuppressants (From admission, onward)      None            Hospital Course:  Mr Schmid is s/p left kidney robotic donor nephrectomy. Intra op, the adrenal gland was adherent to the upper pole of the kidney. It dissection was difficult and bloody due to some adrenal gland tears. The bleeding was controled with packing. Salvador was removed POD1 and was able void w/o any issues and has adequate UOP.     Interval history: NAEON. Pt reports some incisional pain this am. Pt also had an episode of n/v after eating last night. Reports he is feeling better this am, will advance to a regular diet. - flatus, - BM. Cont bowel regimen. Pt is ambulating without assistance. UOP adequate. H/H stable. VSS. Will continue to monitor.     Past Medical, Surgical, Family, and Social History:   Unchanged from H&P.    Scheduled Meds:   ceFAZolin (Ancef) IV (PEDS and ADULTS)  2 g Intravenous Q8H    docusate sodium  100 mg Oral BID    heparin (porcine)  5,000 Units Subcutaneous Q8H    ketorolac  15 mg Intravenous Q6H    polyethylene glycol  17 g Oral BID     Continuous Infusions:  PRN Meds:ondansetron, oxyCODONE-acetaminophen, oxyCODONE-acetaminophen    Intake/Output - Last 3 Shifts         01/21 0700  01/22 0659 01/22 0700  01/23 0659 01/23 0700  01/24 0659    P.O.   360    I.V. (mL/kg)  149.9 (1.8) 1608.7 (19.1)    IV Piggyback  3150 97.7    Total Intake(mL/kg)  3299.9 (39.2) 2066.4 (24.5)    Urine (mL/kg/hr)  1300 1625 (4.3)     "Blood  50     Total Output  1350 1625    Net  +1949.9 +441.4                    Review of Systems   Constitutional:  Negative for chills and fever.   HENT: Negative.     Eyes: Negative.    Respiratory:  Negative for shortness of breath.    Cardiovascular:  Negative for leg swelling.   Gastrointestinal:  Positive for abdominal pain, nausea and vomiting. Negative for abdominal distention.   Endocrine: Negative.    Genitourinary:  Negative for decreased urine volume, difficulty urinating and hematuria.   Musculoskeletal:  Negative for arthralgias and myalgias.   Skin:  Positive for wound.   Allergic/Immunologic: Negative for immunocompromised state.   Neurological:  Negative for dizziness, weakness and light-headedness.   Hematological: Negative.    Psychiatric/Behavioral:  Negative for behavioral problems and confusion. The patient is not nervous/anxious.       Objective:     Vital Signs (Most Recent):  Temp: 98.4 °F (36.9 °C) (01/23/24 1121)  Pulse: 76 (01/23/24 1121)  Resp: 18 (01/23/24 1121)  BP: 139/70 (01/23/24 1121)  SpO2: 98 % (01/23/24 1121) Vital Signs (24h Range):  Temp:  [97.5 °F (36.4 °C)-98.7 °F (37.1 °C)] 98.4 °F (36.9 °C)  Pulse:  [69-88] 76  Resp:  [10-20] 18  SpO2:  [97 %-100 %] 98 %  BP: (129-148)/(70-91) 139/70     Weight: 84.2 kg (185 lb 10 oz)  Height: 5' 11" (180.3 cm)  Body mass index is 25.89 kg/m².     Physical Exam  Vitals and nursing note reviewed.   Constitutional:       Appearance: Normal appearance. He is normal weight.   HENT:      Head: Normocephalic.   Cardiovascular:      Rate and Rhythm: Normal rate.      Pulses: Normal pulses.   Pulmonary:      Effort: Pulmonary effort is normal.   Abdominal:      General: Bowel sounds are normal.      Palpations: Abdomen is soft.      Tenderness: There is abdominal tenderness.      Comments: Lap incisions CDI with dermabond   Musculoskeletal:         General: Normal range of motion.      Right lower leg: No edema.      Left lower leg: No edema. "   Skin:     General: Skin is dry.   Neurological:      Mental Status: He is oriented to person, place, and time. Mental status is at baseline.   Psychiatric:         Behavior: Behavior normal.         Thought Content: Thought content normal.          Laboratory:  CBC:   Recent Labs   Lab 01/22/24  1807 01/23/24  0000 01/23/24  0612   WBC  --  9.77 11.62   RBC  --  4.11* 4.11*   HGB  --  12.8* 12.5*   HCT 37.6* 38.6* 39.6*   PLT  --  230 237   MCV  --  94 96   MCH  --  31.1* 30.4   MCHC  --  33.2 31.6*     BMP:   Recent Labs   Lab 01/23/24  0612   *      K 4.0      CO2 26   BUN 13   CREATININE 2.2*   CALCIUM 8.3*     Labs within the past 24 hours have been reviewed.    Diagnostic Results:  All pertinent imaging reviewed.  Assessment/Plan:     * History of kidney donation  - s/p left kidney robotic donor nephrectomy. Intra op, the adrenal gland was adherent to the upper pole of the kidney. It dissection was difficult and bloody due to some adrenal gland tears. The bleeding was controled with packing.   - UOP adequate, king removed this am   - H/H stable.   - Advanced to a regular diet  - - flatus, - BM. Cont bowel regimen.  - Monitor       Acute blood loss anemia  - Expected post op  - Will monitor with cbc       Discharge Planning: Not a candidate for d/c at this time.     Medical decision making for this encounter includes review of pertinent labs and diagnostic studies, assessment and planning, discussions with consulting providers, discussion with patient/family, and participation in multidisciplinary rounds. Time spent caring for patient: 60 minutes    Mar Workman PA-C  Kidney Transplant  [facility] - Transplant Stepdown

## 2024-01-23 NOTE — PROGRESS NOTES
Admit Note     Met with patient and sister to assess needs. Patient is a 49 y.o. single male, admitted for for living kidney donation.      Patient admitted from home on 1/22/2024 .  At this time, patient presents as alert and oriented x 4, pleasant, good eye contact, well groomed, recall good, concentration/judgement good, average intelligence, calm, communicative, cooperative, and asking and answering questions appropriately.  At this time, patients caregiver presents as alert and oriented x 4, pleasant, good eye contact, well groomed, recall good, concentration/judgement good, average intelligence, calm, communicative, cooperative, and asking and answering questions appropriately.    Household/Family Systems     Patient resides with patient's  self , at [address] [facility]  [place] [facility].  Support system includes his sister Dior Gonsalves ([phone]), and his brother Candido Schmid ([phone]).  Patient does not have dependents that are need of being cared for.     Patients primary caregiver is Dior Gonsalves, patients sister, phone number [phone].  Confirmed patients contact information is [phone] (home);   Telephone Information:   Mobile [phone]   .    During admission, patient's caregiver plans to stay in patient's room.  Confirmed patient and patients caregivers do have access to reliable transportation.    Cognitive Status/Learning     Patient reports reading ability as 12th grade and states patient does not have difficulty with N/A.  Patient reports patient learns best by multisensory learning.   Needed: No.   Highest education level: High School (9-12) or GED    Vocation/Disability   .  Working for Income: yes  If yes, working activity level: Working Full Time  Patient is employed at [employer].     Adherence     Patient reports a high level of adherence to patients health care regimen.  Adherence counseling and education provided. Patient  verbalizes understanding.    Substance Use    Patient reports the following substance usage.    Tobacco: none, patient denies any use.  Alcohol: none, patient denies any use.  Illicit Drugs/Non-prescribed Medications: none, patient denies any use.  Patient states clear understanding of the potential impact of substance use.  Substance abstinence/cessation counseling, education and resources provided and reviewed.     Services Utilizing/ADLS    Infusion Service: Prior to admission, patient utilizing? no  Home Health: Prior to admission, patient utilizing? no  DME: Prior to admission, yes CPAP  Pulmonary/Cardiac Rehab: Prior to admission, no  Dialysis:  Prior to admission, no  Transplant Specialty Pharmacy:  Prior to admission, no; patient provides permission to release necessary information to specialty pharmacy for medications post-tranplant. Resources provided and patient is choosing [facility] pharmacy at this time.    Prior to admission, patient reports patient was independent with ADLS and was driving.  Patient reports patient is not able to care for self at this time due to compromised medical condition (as documented in medical record) and physical weakness..  Patient indicates a willingness to care for self once medically cleared to do so.    Insurance/Medications    Insured by   Payer/Plan Subscr  Sex Relation Sub. Ins. ID Effective Group Num   1. [employer]* AKI TERAN 1975 Male Self [id number]                                   [address] [address]   2. CIGNA - CIGNA* ANIKA RODRIGUEZ III [birth year]1972 Male Organ D* [id number]                                    unknown, [place] [address]      Primary Insurance (for UNOS reporting): Private Insurance  Secondary Insurance (for UNOS reporting): None    Patient reports patient is able to obtain and afford medications at this time and at time of discharge.    Living Will/Healthcare Power of     Patient states patient does not have a LW  and/or HCPA.   provided education regarding LW and HCPA and the completion of forms.    Coping/Mental Health    Patient is coping adequately with the aid of  family members and friends.  Patient denies mental health difficulties. Patient and caregiver denied needing or wanting resources at this time. Patient and caregiver agree to contact transplant team with needs, questions, or concerns as they arise.     Discharge Planning    At time of discharge, patient plans to return to patient's home under the care of Dior Gonsalves.  Patients sister will transport patient.  Per rounds today, expected discharge date has not been medically determined at this time. Patient and patients caregiver  verbalize understanding and are involved in treatment planning and discharge process.    Additional Concerns    Patient's caretaker denies additional needs and/or concerns at this time.  providing ongoing psychosocial support, education, resources and d/c planning as needed.  SW remains available.  remains available. Patient's caregiver verbalizes understanding and agreement with information reviewed,  availability and how to access available resources as needed. Patient denies additional needs and/or concerns at this time. Patient verbalizes understanding and agreement with information reviewed, social work availability, and how to access available resources as needed.

## 2024-01-23 NOTE — NURSING TRANSFER
Nursing Transfer Note      1/22/2024   7:44 PM    Nurse giving handoff:Miguelina FLOR PACU  Nurse receiving handoff:Kendy FLOR TSU    Reason patient is being transferred: s/p anesthesia    Transfer To: Room 43365    Transfer via bed    Transfer with IV pole    Transported by transport staff    Transfer Vital Signs:  Blood Pressure:139/81  Heart Rate:77  O2:99% on RA  Temperature:98.2F  Respirations:11    Additional Lines: Salvador Catheter    4eyes on Skin: yes    Medicines sent: iv fluids infusing    Any special needs or follow-up needed: none    Patient belongings transferred with patient: No    Chart send with patient: Yes    Notified: spouse    Patient reassessed at: 1/22/24 at 1930 (date, time)

## 2024-01-23 NOTE — DISCHARGE SUMMARY
[facility] - Transplant Stepdown  Kidney Transplant  Discharge Summary    Patient Name: German Schmid  MRN: [id number]  Admission Date: 1/22/2024  Hospital Length of Stay: 1 days  Discharge Date and Time:  01/24/2024 10:31 AM  Attending Physician: Augustine Cabrera, *   Discharging Provider: Mar Workman PA-C  Primary Care Provider: Marj, Primary Doctor    HPI:   Mr. Schmid is a 48 y.o. year old Black or  male who has been approved to be a living  kidney  donor. He denies any changes in his health condition since his previous visit.     Procedure(s) (LRB):  XI ROBOTIC NEPHRECTOMY,DONOR (Left)     Hospital Course:    Mr Schmid is s/p left kidney robotic donor nephrectomy. Intra op, the adrenal gland was adherent to the upper pole of the kidney. It dissection was difficult and bloody due to some adrenal gland tears. The bleeding was controled with packing. Salvador was removed POD1 and was able void w/o any issues and has adequate UOP. Cr 2.3 (expected), reviewed by surgeon.  He is tolerating a diet and ambulating well. Incision is clean, dry, and intact. He will f/u labs and transplant clinic in one week.       Goals of Care Treatment Preferences:  Code Status: Full Code      Final Active Diagnoses:    Diagnosis Date Noted POA    PRINCIPAL PROBLEM:  History of kidney donation [Z90.5] 01/23/2024 Not Applicable    Acute blood loss anemia [D62] 01/23/2024 No      Problems Resolved During this Admission:       Treatments: As above    Consults (From admission, onward)          Status Ordering Provider     Inpatient consult to Registered Dietitian/Nutritionist  Once        Provider:  (Not yet assigned)    Acknowledged LAMONTE GRIJALVA            Pending Diagnostic Studies:       Procedure Component Value Units Date/Time    [facility] Living Donor Sample Collection [[id number]] Collected: 01/22/24 1045    Order Status: Sent Lab Status: In process Updated: 01/22/24 1118    Specimen: Blood           Significant Diagnostic  Studies: Labs: CMP   Recent Labs   Lab 01/23/24  0612 01/24/24  0735    139   K 4.0 4.2    105   CO2 26 26   * 87   BUN 13 21*   CREATININE 2.2* 2.3*   CALCIUM 8.3* 8.7   ALBUMIN 3.4* 3.2*   ANIONGAP 7* 8   , CBC   Recent Labs   Lab 01/23/24  0000 01/23/24  0612 01/24/24  0735   WBC 9.77 11.62 6.85   HGB 12.8* 12.5* 12.0*   HCT 38.6* 39.6* 37.7*    237 235   , and All labs within the past 24 hours have been reviewed    Discharged Condition: good    Disposition: Home or Self Care    Follow Up:    Patient Instructions:      Diet Adult Regular     No dressing needed     Notify your health care provider if you experience any of the following:  increased confusion or weakness     Notify your health care provider if you experience any of the following:  persistent dizziness, light-headedness, or visual disturbances     Notify your health care provider if you experience any of the following:  worsening rash     Notify your health care provider if you experience any of the following:  severe persistent headache     Notify your health care provider if you experience any of the following:  difficulty breathing or increased cough     Notify your health care provider if you experience any of the following:  redness, tenderness, or signs of infection (pain, swelling, redness, odor or green/yellow discharge around incision site)     Notify your health care provider if you experience any of the following:  severe uncontrolled pain     Notify your health care provider if you experience any of the following:  persistent nausea and vomiting or diarrhea     Notify your health care provider if you experience any of the following:  temperature >100.4     Type And Screen Preop   Standing Status: Future Standing Exp. Date: 03/10/25     Activity as tolerated     Medications:  Reconciled Home Medications:      Medication List        START taking these medications      docusate sodium 100 MG capsule  Commonly known  as: COLACE  Take 1 capsule (100 mg total) by mouth 3 (three) times daily as needed for Constipation.     ondansetron 4 MG Tbdl  Commonly known as: ZOFRAN-ODT  Dissolve 1 tablet (4 mg total) by mouth every 6 (six) hours as needed (nausea).     oxyCODONE 10 mg Tab immediate release tablet  Commonly known as: ROXICODONE  Take 1 tablet (10 mg total) by mouth every 6 (six) hours as needed for Pain.            Time spent caring for patient (Greater than 1/2 spent in direct face-to-face contact): > 30 minutes    Mar Workman PA-C  Kidney Transplant  [facility] - Transplant Stepdown

## 2024-01-23 NOTE — HOSPITAL COURSE
Mr Schmid is s/p left kidney robotic donor nephrectomy. Intra op, the adrenal gland was adherent to the upper pole of the kidney. It dissection was difficult and bloody due to some adrenal gland tears. The bleeding was controled with packing. Salvador was removed POD1 and was able void w/o any issues and has adequate UOP.     Interval history: NAEON. Pt reports some incisional pain this am. Pt also had an episode of n/v after eating last night. Reports he is feeling better this am, will advance to a regular diet. - flatus, - BM. Cont bowel regimen. Pt is ambulating without assistance. UOP adequate. H/H stable. VSS. Will continue to monitor.

## 2024-01-23 NOTE — ASSESSMENT & PLAN NOTE
- s/p left kidney robotic donor nephrectomy. Intra op, the adrenal gland was adherent to the upper pole of the kidney. It dissection was difficult and bloody due to some adrenal gland tears. The bleeding was controled with packing.   - UOP adequate, king removed this am   - H/H stable.   - Advanced to a regular diet  - - flatus, - BM. Cont bowel regimen.  - Monitor

## 2024-01-23 NOTE — SUBJECTIVE & OBJECTIVE
Subjective:   History of Present Illness:  Mr. Schmid is a 48 y.o. year old Black or  male who has been approved to be a living  kidney  donor. He denies any changes in his health condition since his previous visit.     Mr. Schmid is a 49 y.o. year old male who is status post .    His maintenance immunosuppression consists of:   Immunosuppressants (From admission, onward)      None            Hospital Course:  Mr Schmid is s/p left kidney robotic donor nephrectomy. Intra op, the adrenal gland was adherent to the upper pole of the kidney. It dissection was difficult and bloody due to some adrenal gland tears. The bleeding was controled with packing. Salvador was removed POD1 and was able void w/o any issues and has adequate UOP.     Interval history: NAEON. Pt reports some incisional pain this am. Pt also had an episode of n/v after eating last night. Reports he is feeling better this am, will advance to a regular diet. - flatus, - BM. Cont bowel regimen. Pt is ambulating without assistance. UOP adequate. H/H stable. VSS. Will continue to monitor.     Past Medical, Surgical, Family, and Social History:   Unchanged from H&P.    Scheduled Meds:   ceFAZolin (Ancef) IV (PEDS and ADULTS)  2 g Intravenous Q8H    docusate sodium  100 mg Oral BID    heparin (porcine)  5,000 Units Subcutaneous Q8H    ketorolac  15 mg Intravenous Q6H    polyethylene glycol  17 g Oral BID     Continuous Infusions:  PRN Meds:ondansetron, oxyCODONE-acetaminophen, oxyCODONE-acetaminophen    Intake/Output - Last 3 Shifts         01/21 0700  01/22 0659 01/22 0700 01/23 0659 01/23 0700  01/24 0659    P.O.   360    I.V. (mL/kg)  149.9 (1.8) 1608.7 (19.1)    IV Piggyback  3150 97.7    Total Intake(mL/kg)  3299.9 (39.2) 2066.4 (24.5)    Urine (mL/kg/hr)  1300 1625 (4.3)    Blood  50     Total Output  1350 1625    Net  +1949.9 +441.4                    Review of Systems   Constitutional:  Negative for chills and fever.   HENT:  "Negative.     Eyes: Negative.    Respiratory:  Negative for shortness of breath.    Cardiovascular:  Negative for leg swelling.   Gastrointestinal:  Positive for abdominal pain, nausea and vomiting. Negative for abdominal distention.   Endocrine: Negative.    Genitourinary:  Negative for decreased urine volume, difficulty urinating and hematuria.   Musculoskeletal:  Negative for arthralgias and myalgias.   Skin:  Positive for wound.   Allergic/Immunologic: Negative for immunocompromised state.   Neurological:  Negative for dizziness, weakness and light-headedness.   Hematological: Negative.    Psychiatric/Behavioral:  Negative for behavioral problems and confusion. The patient is not nervous/anxious.       Objective:     Vital Signs (Most Recent):  Temp: 98.4 °F (36.9 °C) (01/23/24 1121)  Pulse: 76 (01/23/24 1121)  Resp: 18 (01/23/24 1121)  BP: 139/70 (01/23/24 1121)  SpO2: 98 % (01/23/24 1121) Vital Signs (24h Range):  Temp:  [97.5 °F (36.4 °C)-98.7 °F (37.1 °C)] 98.4 °F (36.9 °C)  Pulse:  [69-88] 76  Resp:  [10-20] 18  SpO2:  [97 %-100 %] 98 %  BP: (129-148)/(70-91) 139/70     Weight: 84.2 kg (185 lb 10 oz)  Height: 5' 11" (180.3 cm)  Body mass index is 25.89 kg/m².     Physical Exam  Vitals and nursing note reviewed.   Constitutional:       Appearance: Normal appearance. He is normal weight.   HENT:      Head: Normocephalic.   Cardiovascular:      Rate and Rhythm: Normal rate.      Pulses: Normal pulses.   Pulmonary:      Effort: Pulmonary effort is normal.   Abdominal:      General: Bowel sounds are normal.      Palpations: Abdomen is soft.      Tenderness: There is abdominal tenderness.      Comments: Lap incisions CDI with dermabond   Musculoskeletal:         General: Normal range of motion.      Right lower leg: No edema.      Left lower leg: No edema.   Skin:     General: Skin is dry.   Neurological:      Mental Status: He is oriented to person, place, and time. Mental status is at baseline.   Psychiatric:   "       Behavior: Behavior normal.         Thought Content: Thought content normal.          Laboratory:  CBC:   Recent Labs   Lab 01/22/24  1807 01/23/24  0000 01/23/24  0612   WBC  --  9.77 11.62   RBC  --  4.11* 4.11*   HGB  --  12.8* 12.5*   HCT 37.6* 38.6* 39.6*   PLT  --  230 237   MCV  --  94 96   MCH  --  31.1* 30.4   MCHC  --  33.2 31.6*     BMP:   Recent Labs   Lab 01/23/24  0612   *      K 4.0      CO2 26   BUN 13   CREATININE 2.2*   CALCIUM 8.3*     Labs within the past 24 hours have been reviewed.    Diagnostic Results:  All pertinent imaging reviewed.

## 2024-01-23 NOTE — CONSULTS
"  [facility] - Transplant Stepdown  Adult Nutrition  Consult Note    SUMMARY     Recommendations    1. Continue Regular diet      2. If PO intake <50%, add Boost plus BID     3. RD to monitor and follow    Goals: Meet % EEN, EPN by RD f/u  Nutrition Goal Status: new  Communication of RD Recs:  (POC)    Assessment and Plan    Nutrition Problem  Increased nutrient needs (protein, energy)    Related to (etiology):   Increased physiological demands    Signs and Symptoms (as evidenced by):   S/p donor nephrectomy on 1/22/24.    Interventions/Recommendations (treatment strategy):  Collaboration with other providers  Nutrition education    Nutrition Diagnosis Status:   New     Reason for Assessment    Reason For Assessment: consult  Diagnosis:  (hx of kidney donation)  Interdisciplinary Rounds: did not attend    General Information Comments:   S/p donor nephrectomy on 1/22/24. Pt reports good appetite PTA. Pt had nausea and emesis last night after eating. Feeling better today, tolerated some breakfast this morning. Denies N/V at this time. Stated  lb, denies any significant wt changes recently. Pt appears nourished, NFPE not warranted. No indicator of malnutrition.     Nutrition Discharge Planning: Provided patient with post-surgery/nephrectomy recommendations on 1/23. General healthy diet encouraged. Increased protein and fluid consumption recommended. No other needs identified.    Nutrition Risk Screen    Nutrition Risk Screen: no indicators present    Nutrition/Diet History    Spiritual, Cultural Beliefs, Yazdanism Practices, Values that Affect Care: no    Anthropometrics    Temp: 98.4 °F (36.9 °C)  Height Method: Stated  Height: 5' 11" (180.3 cm)  Height (inches): 71 in  Weight Method: Standard Scale  Weight: 84.2 kg (185 lb 10 oz)  Weight (lb): 185.63 lb  Ideal Body Weight (IBW), Male: 172 lb  % Ideal Body Weight, Male (lb): 107.92 %  BMI (Calculated): 25.9       Lab/Procedures/Meds    Pertinent Labs " Reviewed: reviewed  Pertinent Labs Comments: H/H: 12.5/39.6, glucose 111, Cr 2.2, albumin 3.4, eGFR 35.4  Pertinent Medications Reviewed: reviewed  Pertinent Medications Comments: polyethylene glycol, heparin, docusate    Estimated/Assessed Needs    Weight Used For Calorie Calculations: 83.9 kg (185 lb)  Energy Calorie Requirements (kcal): 2145 kcal  Energy Need Method: Calvert-St Jeor (SF 1.25)  Protein Requirements: 117-140g (1.5-1.8g/kg IBW)  Weight Used For Protein Calculations: 78 kg (172 lb) (IBW)  Fluid Requirements (mL): 1ml/kcal or per MD  Estimated Fluid Requirement Method: RDA Method  RDA Method (mL): 2145     Nutrition Prescription Ordered    Current Diet Order: Regular    Evaluation of Received Nutrient/Fluid Intake    I/O: - 1.9 L since admit  Energy Calories Required: not meeting needs  Protein Required: not meeting needs  Comments: LBM 1/21  Tolerance: tolerating    Nutrition Risk    Level of Risk/Frequency of Follow-up:  (1x/week)       Monitor and Evaluation    Food and Nutrient Intake: energy intake, food and beverage intake  Food and Nutrient Adminstration: diet order  Knowledge/Beliefs/Attitudes: food and nutrition knowledge/skill  Physical Activity and Function: nutrition-related ADLs and IADLs  Anthropometric Measurements: height/length, weight, weight change, body mass index  Biochemical Data, Medical Tests and Procedures: electrolyte and renal panel, glucose/endocrine profile, gastrointestinal profile, inflammatory profile, lipid profile  Nutrition-Focused Physical Findings: overall appearance       Nutrition Follow-Up    RD Follow-up?: Yes

## 2024-01-23 NOTE — PLAN OF CARE
Recommendations    1. Continue Regular diet      2. If PO intake <50%, add Boost plus BID     3. RD to monitor and follow    Goals: Meet % EEN, EPN by RD f/u  Nutrition Goal Status: new  Communication of RD Recs:  (POC)

## 2024-01-23 NOTE — PROGRESS NOTES
DONOR NEPHRECTOMY NOTE:    German Schmid is s/p donor nephrectomy on 1/22/24. This patients medications prior to surgery were reviewed and restarted, as appropriate.

## 2024-01-23 NOTE — HPI
Mr. Schmid is a 48 y.o. year old Black or  male who has been approved to be a living  kidney  donor. He denies any changes in his health condition since his previous visit.

## 2024-01-24 VITALS
HEART RATE: 64 BPM | WEIGHT: 185.63 LBS | BODY MASS INDEX: 25.99 KG/M2 | RESPIRATION RATE: 19 BRPM | OXYGEN SATURATION: 98 % | TEMPERATURE: 98 F | SYSTOLIC BLOOD PRESSURE: 156 MMHG | HEIGHT: 71 IN | DIASTOLIC BLOOD PRESSURE: 86 MMHG

## 2024-01-24 DIAGNOSIS — Z52.4 KIDNEY DONOR: Primary | ICD-10-CM

## 2024-01-24 LAB
ALBUMIN SERPL BCP-MCNC: 3.2 G/DL (ref 3.5–5.2)
ANION GAP SERPL CALC-SCNC: 8 MMOL/L (ref 8–16)
BASOPHILS # BLD AUTO: 0.02 K/UL (ref 0–0.2)
BASOPHILS NFR BLD: 0.3 % (ref 0–1.9)
BUN SERPL-MCNC: 21 MG/DL (ref 6–20)
CALCIUM SERPL-MCNC: 8.7 MG/DL (ref 8.7–10.5)
CHLORIDE SERPL-SCNC: 105 MMOL/L (ref 95–110)
CO2 SERPL-SCNC: 26 MMOL/L (ref 23–29)
CREAT SERPL-MCNC: 2.3 MG/DL (ref 0.5–1.4)
DIFFERENTIAL METHOD BLD: ABNORMAL
EOSINOPHIL # BLD AUTO: 0 K/UL (ref 0–0.5)
EOSINOPHIL NFR BLD: 0.3 % (ref 0–8)
ERYTHROCYTE [DISTWIDTH] IN BLOOD BY AUTOMATED COUNT: 12.1 % (ref 11.5–14.5)
EST. GFR  (NO RACE VARIABLE): 33.5 ML/MIN/1.73 M^2
GLUCOSE SERPL-MCNC: 87 MG/DL (ref 70–110)
HCT VFR BLD AUTO: 37.7 % (ref 40–54)
HGB BLD-MCNC: 12 G/DL (ref 14–18)
IMM GRANULOCYTES # BLD AUTO: 0.03 K/UL (ref 0–0.04)
IMM GRANULOCYTES NFR BLD AUTO: 0.4 % (ref 0–0.5)
LYMPHOCYTES # BLD AUTO: 1.5 K/UL (ref 1–4.8)
LYMPHOCYTES NFR BLD: 21.3 % (ref 18–48)
MAGNESIUM SERPL-MCNC: 2.3 MG/DL (ref 1.6–2.6)
MCH RBC QN AUTO: 30.5 PG (ref 27–31)
MCHC RBC AUTO-ENTMCNC: 31.8 G/DL (ref 32–36)
MCV RBC AUTO: 96 FL (ref 82–98)
MONOCYTES # BLD AUTO: 0.7 K/UL (ref 0.3–1)
MONOCYTES NFR BLD: 10.7 % (ref 4–15)
NEUTROPHILS # BLD AUTO: 4.6 K/UL (ref 1.8–7.7)
NEUTROPHILS NFR BLD: 67 % (ref 38–73)
NRBC BLD-RTO: 0 /100 WBC
PHOSPHATE SERPL-MCNC: 2.5 MG/DL (ref 2.7–4.5)
PLATELET # BLD AUTO: 235 K/UL (ref 150–450)
PMV BLD AUTO: 9.9 FL (ref 9.2–12.9)
POTASSIUM SERPL-SCNC: 4.2 MMOL/L (ref 3.5–5.1)
RBC # BLD AUTO: 3.94 M/UL (ref 4.6–6.2)
SODIUM SERPL-SCNC: 139 MMOL/L (ref 136–145)
WBC # BLD AUTO: 6.85 K/UL (ref 3.9–12.7)

## 2024-01-24 PROCEDURE — 83735 ASSAY OF MAGNESIUM: CPT | Performed by: PHYSICIAN ASSISTANT

## 2024-01-24 PROCEDURE — 85025 COMPLETE CBC W/AUTO DIFF WBC: CPT | Performed by: STUDENT IN AN ORGANIZED HEALTH CARE EDUCATION/TRAINING PROGRAM

## 2024-01-24 PROCEDURE — 25000003 PHARM REV CODE 250: Performed by: PHYSICIAN ASSISTANT

## 2024-01-24 PROCEDURE — 80069 RENAL FUNCTION PANEL: CPT | Performed by: STUDENT IN AN ORGANIZED HEALTH CARE EDUCATION/TRAINING PROGRAM

## 2024-01-24 PROCEDURE — 99024 POSTOP FOLLOW-UP VISIT: CPT | Mod: ,,, | Performed by: PHYSICIAN ASSISTANT

## 2024-01-24 PROCEDURE — 36415 COLL VENOUS BLD VENIPUNCTURE: CPT | Performed by: STUDENT IN AN ORGANIZED HEALTH CARE EDUCATION/TRAINING PROGRAM

## 2024-01-24 PROCEDURE — 63600175 PHARM REV CODE 636 W HCPCS: Performed by: STUDENT IN AN ORGANIZED HEALTH CARE EDUCATION/TRAINING PROGRAM

## 2024-01-24 PROCEDURE — 25000003 PHARM REV CODE 250: Performed by: STUDENT IN AN ORGANIZED HEALTH CARE EDUCATION/TRAINING PROGRAM

## 2024-01-24 RX ORDER — ONDANSETRON 4 MG/1
4 TABLET, ORALLY DISINTEGRATING ORAL EVERY 6 HOURS PRN
Qty: 20 TABLET | Refills: 0 | Status: SHIPPED | OUTPATIENT
Start: 2024-01-24

## 2024-01-24 RX ORDER — DOCUSATE SODIUM 100 MG/1
100 CAPSULE, LIQUID FILLED ORAL 3 TIMES DAILY PRN
Refills: 0 | COMMUNITY
Start: 2024-01-24

## 2024-01-24 RX ORDER — OXYCODONE HYDROCHLORIDE 10 MG/1
10 TABLET ORAL EVERY 6 HOURS PRN
Qty: 28 TABLET | Refills: 0 | Status: SHIPPED | OUTPATIENT
Start: 2024-01-24

## 2024-01-24 RX ADMIN — KETOROLAC TROMETHAMINE 15 MG: 30 INJECTION, SOLUTION INTRAMUSCULAR; INTRAVENOUS at 12:01

## 2024-01-24 RX ADMIN — HEPARIN SODIUM 5000 UNITS: 5000 INJECTION INTRAVENOUS; SUBCUTANEOUS at 05:01

## 2024-01-24 RX ADMIN — OXYCODONE HYDROCHLORIDE AND ACETAMINOPHEN 1 TABLET: 5; 325 TABLET ORAL at 10:01

## 2024-01-24 RX ADMIN — OXYCODONE AND ACETAMINOPHEN 1 TABLET: 10; 325 TABLET ORAL at 05:01

## 2024-01-24 NOTE — PROGRESS NOTES
DONOR NEPHRECTOMY EDUCATION & DISCHARGE NOTE:    Discharge medications are to include pain control (oxycodone IR tablets and tylenol) and Colace/Dulcolax while taking pain medications to prevent constipation.He will also have zofran prescribed for nausea. Patient was counseled at discharge regarding the side effects of pain medication, including drowsiness, constipation, nausea and counseled not to operate a car while taking pain medication. Patient verbalized understanding.

## 2024-01-24 NOTE — PLAN OF CARE
Problem: Adult Inpatient Plan of Care  Goal: Plan of Care Review  Outcome: Ongoing, Progressing  Goal: Patient-Specific Goal (Individualized)  Outcome: Ongoing, Progressing  Goal: Absence of Hospital-Acquired Illness or Injury  Outcome: Ongoing, Progressing  Goal: Optimal Comfort and Wellbeing  Outcome: Ongoing, Progressing  Goal: Readiness for Transition of Care  Outcome: Ongoing, Progressing     Problem: Infection  Goal: Absence of Infection Signs and Symptoms  Outcome: Ongoing, Progressing     Problem: Bleeding (Surgery Nonspecified)  Goal: Absence of Bleeding  Outcome: Ongoing, Progressing     Problem: Bowel Motility Impaired (Surgery Nonspecified)  Goal: Effective Bowel Elimination  Outcome: Ongoing, Progressing     Problem: Fluid and Electrolyte Imbalance (Surgery Nonspecified)  Goal: Fluid and Electrolyte Balance  Outcome: Ongoing, Progressing     Problem: Glycemic Control Impaired (Surgery Nonspecified)  Goal: Blood Glucose Level Within Targeted Range  Outcome: Ongoing, Progressing     Problem: Infection (Surgery Nonspecified)  Goal: Absence of Infection Signs and Symptoms  Outcome: Ongoing, Progressing     Problem: Ongoing Anesthesia Effects (Surgery Nonspecified)  Goal: Anesthesia/Sedation Recovery  Outcome: Ongoing, Progressing     Problem: Pain (Surgery Nonspecified)  Goal: Acceptable Pain Control  Outcome: Ongoing, Progressing     Problem: Postoperative Nausea and Vomiting (Surgery Nonspecified)  Goal: Nausea and Vomiting Relief  Outcome: Ongoing, Progressing     Problem: Postoperative Urinary Retention (Surgery Nonspecified)  Goal: Effective Urinary Elimination  Outcome: Ongoing, Progressing     Problem: Respiratory Compromise (Surgery Nonspecified)  Goal: Effective Oxygenation and Ventilation  Outcome: Ongoing, Progressing     Problem: Fall Injury Risk  Goal: Absence of Fall and Fall-Related Injury  Outcome: Ongoing, Progressing

## 2024-01-24 NOTE — PROGRESS NOTES
AVS reviewed with pt at bedside. Questions encouraged and answered accordingly. Pt verbalized understanding of AVS. Vss on bedside monitor, piv removed x2. Pt declined wheelchair services. Prescription meds delivered to room prior to pt leaving. He ambulated off the unit with his belongings. No distress noted.

## 2024-01-24 NOTE — PROGRESS NOTES
Transplant Social Work Discharge Note:    Pt will discharge to patient's home under the care of Colleen Gonsalves, patient's sister, phone number [phone].     Patient reports readiness to discharge at this time. Per rounds this morning patient does not require referrals from this  at time of discharge. Patient and caregiver denied needing or wanting referrals at this time. Patient and caregiver agree to contact transplant team with needs, questions,or concerns as they arise.     Pt aware of, involved in, and coping well with this discharge plan. Pt did not have any concerns with the discharge plan at this time. SW remains available at [phone].    Ranjana Haro LMSW

## 2024-01-24 NOTE — PROGRESS NOTES
Patient and caregiver resting comfortably during teaching.  All questions answered.  Donor encouraged to call us with any problems after donation.  Post op appointment discussed, will be scheduled on Friday 2/2/24.      KIDNEY DONOR DISCHARGE INSTRUCTIONS    No heavy lifting (greater than 10-15 pounds) for six weeks.  Showers only, for the first two weeks after surgery.   You can take a tub bath after two weeks or when your incision is completely healed.     Clean the incision in the shower with a mild soap and water, rinse and dry.   4.   Call the outpatient kidney transplant coordinator Monday through Friday 8:00 a.m. - 4:30 p.m. at  [phone] or [phone], ext. [phone] if calling long distance.  After hours, on weekends  and holidays call [phone] or [phone] and you will be directed to [facility] RN On Call  Service for the following:    Signs and symptoms of wound infection  Redness and/or swelling of the wound  Drainage from the wound  Temperature > 101.0 F   Wound opening or separation     Signs and symptoms of urinary tract infection  Frequency of urination or problems urinating  Burning during urination  Cloudy or bloody urine  Foul smelling urine  Temperature > 101.0  Any other medical problems in the immediate discharge period which are of concern to you.    You will need to see the transplant doctor approximately four weeks after discharge.  Blood and urine specimens will be obtained two hours prior to your appointment.  If you did not receive the appointments upon discharge you can schedule your appointments by calling [phone] or [phone], ext. [phone].   Discuss with the doctor at your clinic appointment when you can return to driving and work.  If all goes well, usually this is within 4 to 6 weeks.    Six months after donating your kidney, you will again need lab work and a checkup with your doctor. As well as on a yearly basis.  Our office will need copies of these records for  the first 2 years after donation.  Please have your doctor fax them to us at [phone].  Discussed with the patient and caregiver the importance of maintaining COVID-19 precautions; wearing a mask, good handwashing, and social distancing.  Also, to report any signs or symptoms (fever, difficulty breathing, loss of taste/smell, etc.), suspected exposure, or COVID testing, immediately to the transplant program.               *YOU SHOULD ALWAYS HAVE YEARLY MEDICAL CHECKUPS WITH YOUR LOCAL PHYSICIAN, INCLUDING BLOOD WORK TO EVALUATE YOUR KIDNEY FUNCTION.   *Avoid Advil. Motrin, Aleive and other Non-Steriodals, these can be toxic to your kidney.    *Tylenol is okay.              Dear Kidney Donor,    Thank you so much for your heroic gift.  One never really knows, if they will be called to be a hero and whether or not they will be able to. You have accomplished this in our eyes, as well as, your recipients.  This is truly an amazing gift that you have given.    At discharge, you will be given a follow up appointment for lab work and a surgical visit which will occur about 4 weeks after donation.  At this appointment you will discuss with the physician when you will be able to return to normal activities, including work, as well as any other concerns you may have.      In order to ensure your health after donation, as well the health of future donors, we will need to do some routine follow up.  This will entail a call from our transplant office at   6 months, 1 year and 2 years after donation.   We are required to send information to [employer] ([facility] [employer] [facility]) on your progress and health at these intervals.    We will inquire about the following:   Current weight  Diagnostic tests done since our last call (CT scan, MRI, Ultrasound)  Lab work results including a creatinine and urinalysis  Blood Pressure reading  Any new medical conditions such as kidney problems, diabetes or hypertension  Admission to the  hospital, if so, for what reason     It is very important that after donation you establish with a Primary Care Physician to maintain your health.  We ask that you see your physician at 6 months after donation, and then yearly.  These visits should include a complete physical exam, as well as blood work, including complete chemistries and urinalysis.  Please have your physician fax these lab results and clinic notes to us at [phone].    If there is anything we can help you with please call us at [phone].  We sincerely hope your donation experience was a pleasant one and wish you good health and well being.        Your Transplant Team at [facility]

## 2024-01-25 LAB
HLA LIVING DONOR SAMPLE COLLECTION INTERPRETATION: NORMAL
HLA-B27 RELATED AG QL: NORMAL

## 2024-01-25 NOTE — PHYSICIAN QUERY
PT Name: German Schmid  MR #: [id number]    DOCUMENTATION CLARIFICATION      CDS/: José Manuel Vieyra Jr, RN CCDS              Contact information:[email or web address]    This form is a permanent document in the medical record.      Query Date: January 25, 2024    By submitting this query, we are merely seeking further clarification of documentation. Please utilize your independent clinical judgment when addressing the question(s) below.    The Medical Record contains the following:   Indicators  Supporting Clinical Findings Location in Medical Record   x Anemia documented Acute blood loss anemia  - Expected post op  - Will monitor with cbc    1/23 Kidney Transplant Progress Note   x H&H  Latest Reference Range & Units 01/22/24 18:07 01/23/24 00:00 01/23/24 06:12 01/24/24 07:35   Hemoglobin 14.0 - 18.0 g/dL  12.8 (L) 12.5 (L) 12.0 (L)   Hematocrit 40.0 - 54.0 % 37.6 (L) 38.6 (L) 39.6 (L) 37.7 (L)   (L): Data is abnormally low   1/22-1/24 Labs   x BP                    HR DM=963/91-->139/81-->146/77-->142/79    HR=82-->81-->80-->69   1/22-1/24 VS Flowsheet    1/22-1/24 VS Flowsheet   x Bleeding s/p left kidney robotic donor nephrectomy. Intra op, the adrenal gland was adherent to the upper pole of the kidney. It dissection was difficult and bloody due to some adrenal gland tears. The bleeding was controled with packing.  1/23 Kidney Transplant    x Procedure/Surgery Performed/EBL Procedure(s) Performed:   Left Kidney  robotic donor nephrectomy      Estimated Blood Loss: 1.69 fl. oz. mL 1/22 Op Note        1/22 Op Note    Transfusion(s)      Acute/Chronic illness      Treatments      Other       Due to the conflicting clinical picture, please clinically validate the diagnosis of       Acute blood loss anemia       If validated, please provide additional clinical support for the diagnosis.       [   ] Acute blood loss anemia is not confirmed and/or it has been ruled out   [X] Acute blood loss anemia is confirmed.    Please specify any signs, symptoms, and/or treatment to validate Acute blood loss anemia   (Please Specify): Oozing in OR due to adrenal gland tears. EBL 1.69 fl. oz. mL. Monitoring post op for additional blood loss.     [   ] Other clarification (please specify): ___________________              Please document in your progress notes daily for the duration of treatment, until resolved, and include in your discharge summary.    Form No. [id number]

## 2024-01-25 NOTE — PHYSICIAN QUERY
PT Name: German Schmid  MR #: [id number]    DOCUMENTATION CLARIFICATION     CDS/: José Manuel Vieyra Jr, RN CCDS              Contact information: [email or web address]    This form is a permanent document in the medical record.     Query Date: January 25, 2024    Dear Provider,  By submitting this query, we are merely seeking further clarification of documentation. Please utilize your independent clinical judgment when addressing the question(s) below.    The medical record contains the following:  Supporting Clinical Findings Location in Medical Record   Procedure(s) Performed:   Left Kidney  robotic donor nephrectomy    Procedure in Detail  Following the induction of general endotracheal anesthesia, the patient was positioned in a right lateral decubitus position with the table flexed.  The abdomen and left flank were sterilely prepped and draped.  A balloon-tipped 12 mm laparoscopic port was introduced just below the umbilicus using an open Pedro technique.  The abdomen was then insufflated to 15 mmHg pressure.  Three additional ports were then placed consisting of a 12 mm port to the left of the umbilicus, an 8 mm da Miakel port just below the left costal margin near the mid axillary line and another 8 mm da Maikel port just medial to the anterior superior iliac spine.  The da Maikel robotic system was then docked to these ports.  Dissection was then carried out using the da Maikel system.  The colon was mobilized down to the pelvic brim.  Gerota's fascia was then opened and the left renal vein was identified.  Lumbar (x3), gonadal (x2), and adrenal tributaries were clipped and divided as appropriate.  The left renal artery was identified as proximally as feasible.  The adrenal gland was dissected away from the superior pole of the kidney.  The ureter was swept upwards off of the psoas muscle with care taken to avoid traumatizing it.  The posterior and lateral attachments of the kidney were then taken down.   The adrenal gland was adherent to the upper pole of the kidney. It dissection was difficult and bloody due to some adrenal gland tears. The bleeding was controled with packing. An appropriately-sized vertical incision was created just below the umbilicus for extraction of the kidney.  This was extended just large enough to admit the surgeon's hand.  The Gelport device was placed at this point.  The abdomen was re-insufflated, and the kidney was inspected with conventional laparoscopy to ensure that all non-vascular attachments hand been taken down.  Additional cautery dissection was done as needed.  The patient was given 10 mg of Lasix to ensure a brisk diuresis.  The ureter was then divided distally using a vascular Endo-ROSMERY stapler.  After this was done, the  renal artery was divided with a vascular stapler followed by the renal vein.  The kidney was then removed through the Gelport and immediately placed on ice and flushed with ice cold UW solution. Attention was then directed to the operative field.  The operative field was thoroughly irrigated and assessed for hemostasis. The adrenal gland was carefully evaluated. Minimal oozing was observing. Those areas were covered with Fallentimber with successful hemostasis. The port sites were assessed for size of the fascial defect, and external suturing or a specifically designed closure product was used as appropriate.  The kidney extraction incision was closed with continuous #1 PDS.  All incisions were infiltrated with bupivicaine. Skin incisions were closed with 4-0 subcuticular Monocryl.  The patient was then taken from the Operating Room in satisfactory condition.  Prior to the conclusion of the procedure, I was told that all sponge, needle and instrument counts were correct.      History of kidney donation  - s/p left kidney robotic donor nephrectomy. Intra op, the adrenal gland was adherent to the upper pole of the kidney. It dissection was difficult and bloody due  to some adrenal gland tears. The bleeding was controled with packing.    1/22 Op Note      1/22 Op Note                                                                                          1/23 Kidney Transplant Progress Note                 Please clarify if          Adrenal Gland Tears       (as it relates to the      Left Kidney Nephrectomy      ) is:      [X  ] Present, but not a complication of the procedure   [  ] Complication of the procedure   [  ] Other (please specify): __________________   [  ] Clinically Undetermined       Please document in your progress notes daily for the duration of treatment until resolved and include in your discharge summary.

## 2024-01-26 ENCOUNTER — PATIENT OUTREACH (OUTPATIENT)
Dept: ADMINISTRATIVE | Facility: CLINIC | Age: 49
End: 2024-01-26
Payer: COMMERCIAL

## 2024-01-26 RX ORDER — ACETAMINOPHEN 500 MG
500 TABLET ORAL EVERY 6 HOURS PRN
COMMUNITY

## 2024-01-26 NOTE — PROGRESS NOTES
C3 nurse spoke with German Schmid  for a TCC post hospital discharge follow up call. The patient reports does not have a scheduled HOSFU appointment. C3 nurse was unable to schedule HOSFU appointment for Non-[facility] PCP. Patient advised to contact their PCP to schedule a HOSPFU within 5-7 days.

## 2024-02-02 ENCOUNTER — OFFICE VISIT (OUTPATIENT)
Dept: TRANSPLANT | Facility: CLINIC | Age: 49
End: 2024-02-02
Payer: COMMERCIAL

## 2024-02-02 ENCOUNTER — CLINICAL SUPPORT (OUTPATIENT)
Dept: TRANSPLANT | Facility: CLINIC | Age: 49
End: 2024-02-02
Payer: COMMERCIAL

## 2024-02-02 ENCOUNTER — LAB VISIT (OUTPATIENT)
Dept: LAB | Facility: HOSPITAL | Age: 49
End: 2024-02-02
Attending: TRANSPLANT SURGERY
Payer: COMMERCIAL

## 2024-02-02 VITALS
HEART RATE: 91 BPM | HEIGHT: 71 IN | SYSTOLIC BLOOD PRESSURE: 137 MMHG | RESPIRATION RATE: 16 BRPM | TEMPERATURE: 97 F | DIASTOLIC BLOOD PRESSURE: 89 MMHG | WEIGHT: 183.88 LBS | DIASTOLIC BLOOD PRESSURE: 89 MMHG | HEIGHT: 71 IN | BODY MASS INDEX: 25.74 KG/M2 | BODY MASS INDEX: 25.74 KG/M2 | OXYGEN SATURATION: 100 % | TEMPERATURE: 97 F | RESPIRATION RATE: 16 BRPM | SYSTOLIC BLOOD PRESSURE: 137 MMHG | HEART RATE: 91 BPM | OXYGEN SATURATION: 100 % | WEIGHT: 183.88 LBS

## 2024-02-02 DIAGNOSIS — Z52.4 KIDNEY DONOR: ICD-10-CM

## 2024-02-02 LAB
ALBUMIN SERPL BCP-MCNC: 4 G/DL (ref 3.5–5.2)
ANION GAP SERPL CALC-SCNC: 7 MMOL/L (ref 8–16)
BASOPHILS # BLD AUTO: 0.04 K/UL (ref 0–0.2)
BASOPHILS NFR BLD: 0.7 % (ref 0–1.9)
BILIRUB UR QL STRIP: NEGATIVE
BUN SERPL-MCNC: 27 MG/DL (ref 6–20)
CALCIUM SERPL-MCNC: 10 MG/DL (ref 8.7–10.5)
CHLORIDE SERPL-SCNC: 100 MMOL/L (ref 95–110)
CLARITY UR REFRACT.AUTO: CLEAR
CO2 SERPL-SCNC: 28 MMOL/L (ref 23–29)
COLOR UR AUTO: YELLOW
CREAT SERPL-MCNC: 2.2 MG/DL (ref 0.5–1.4)
DIFFERENTIAL METHOD BLD: ABNORMAL
EOSINOPHIL # BLD AUTO: 0.1 K/UL (ref 0–0.5)
EOSINOPHIL NFR BLD: 1.2 % (ref 0–8)
ERYTHROCYTE [DISTWIDTH] IN BLOOD BY AUTOMATED COUNT: 11.6 % (ref 11.5–14.5)
EST. GFR  (NO RACE VARIABLE): 35.4 ML/MIN/1.73 M^2
GLUCOSE SERPL-MCNC: 99 MG/DL (ref 70–110)
GLUCOSE UR QL STRIP: NEGATIVE
HCT VFR BLD AUTO: 43.9 % (ref 40–54)
HGB BLD-MCNC: 14.3 G/DL (ref 14–18)
HGB UR QL STRIP: ABNORMAL
IMM GRANULOCYTES # BLD AUTO: 0.02 K/UL (ref 0–0.04)
IMM GRANULOCYTES NFR BLD AUTO: 0.4 % (ref 0–0.5)
KETONES UR QL STRIP: NEGATIVE
LEUKOCYTE ESTERASE UR QL STRIP: NEGATIVE
LYMPHOCYTES # BLD AUTO: 2.2 K/UL (ref 1–4.8)
LYMPHOCYTES NFR BLD: 39.1 % (ref 18–48)
MCH RBC QN AUTO: 30 PG (ref 27–31)
MCHC RBC AUTO-ENTMCNC: 32.6 G/DL (ref 32–36)
MCV RBC AUTO: 92 FL (ref 82–98)
MONOCYTES # BLD AUTO: 0.7 K/UL (ref 0.3–1)
MONOCYTES NFR BLD: 11.6 % (ref 4–15)
NEUTROPHILS # BLD AUTO: 2.6 K/UL (ref 1.8–7.7)
NEUTROPHILS NFR BLD: 47 % (ref 38–73)
NITRITE UR QL STRIP: NEGATIVE
NRBC BLD-RTO: 0 /100 WBC
PH UR STRIP: 5 [PH] (ref 5–8)
PHOSPHATE SERPL-MCNC: 3.4 MG/DL (ref 2.7–4.5)
PLATELET # BLD AUTO: 342 K/UL (ref 150–450)
PMV BLD AUTO: 9 FL (ref 9.2–12.9)
POTASSIUM SERPL-SCNC: 5 MMOL/L (ref 3.5–5.1)
PROT UR QL STRIP: ABNORMAL
RBC # BLD AUTO: 4.76 M/UL (ref 4.6–6.2)
SODIUM SERPL-SCNC: 135 MMOL/L (ref 136–145)
SP GR UR STRIP: 1.02 (ref 1–1.03)
URN SPEC COLLECT METH UR: ABNORMAL
WBC # BLD AUTO: 5.62 K/UL (ref 3.9–12.7)

## 2024-02-02 PROCEDURE — 36415 COLL VENOUS BLD VENIPUNCTURE: CPT | Performed by: TRANSPLANT SURGERY

## 2024-02-02 PROCEDURE — 3008F BODY MASS INDEX DOCD: CPT | Mod: CPTII,S$GLB,, | Performed by: TRANSPLANT SURGERY

## 2024-02-02 PROCEDURE — 99999 PR PBB SHADOW E&M-EST. PATIENT-LVL III: CPT | Mod: PBBFAC,,,

## 2024-02-02 PROCEDURE — 3079F DIAST BP 80-89 MM HG: CPT | Mod: CPTII,S$GLB,, | Performed by: TRANSPLANT SURGERY

## 2024-02-02 PROCEDURE — 1111F DSCHRG MED/CURRENT MED MERGE: CPT | Mod: CPTII,S$GLB,, | Performed by: TRANSPLANT SURGERY

## 2024-02-02 PROCEDURE — 81003 URINALYSIS AUTO W/O SCOPE: CPT | Performed by: TRANSPLANT SURGERY

## 2024-02-02 PROCEDURE — 3066F NEPHROPATHY DOC TX: CPT | Mod: CPTII,S$GLB,, | Performed by: TRANSPLANT SURGERY

## 2024-02-02 PROCEDURE — 3075F SYST BP GE 130 - 139MM HG: CPT | Mod: CPTII,S$GLB,, | Performed by: TRANSPLANT SURGERY

## 2024-02-02 PROCEDURE — 99024 POSTOP FOLLOW-UP VISIT: CPT | Mod: S$GLB,,, | Performed by: TRANSPLANT SURGERY

## 2024-02-02 PROCEDURE — 85025 COMPLETE CBC W/AUTO DIFF WBC: CPT | Performed by: TRANSPLANT SURGERY

## 2024-02-02 PROCEDURE — 80069 RENAL FUNCTION PANEL: CPT | Performed by: TRANSPLANT SURGERY

## 2024-02-02 NOTE — PROGRESS NOTES
Patient here for post op appointment s/p kidney donation.  Patient states he is feeling good. No complaints of pain, no longer requiring pain medication.   Appetite is good and bowels are moving without difficulty.   Incisions are healing well without redness or drainage.     Driving and activity restrictions reviewed. Patient encouraged to contact me with any questions or problems.   Next follow up will be at 6 month anniversary.

## 2024-02-02 NOTE — PROGRESS NOTES
Transplant Surgery Kidney Donor Follow-up    Chief Complaint: German Schmid is a 49 y.o. year old male who  Underwent left robotic/laparoscopic donor nephrectomy on .  He presents for surgical follow-up.  Current symptoms include  sorenes of left scrotum/testicle .     External provider notes reviewed: Yes    Review of Systems   Constitutional:  Negative for activity change and appetite change.   HENT:  Negative for congestion and tinnitus.    Eyes:  Negative for redness and visual disturbance.   Respiratory:  Negative for cough and shortness of breath.    Cardiovascular:  Negative for chest pain and palpitations.   Gastrointestinal:  Negative for abdominal distention and abdominal pain.   Endocrine: Negative for polydipsia and polyuria.   Genitourinary:  Negative for decreased urine volume and dysuria.   Musculoskeletal:  Negative for arthralgias and back pain.   Skin:  Negative for pallor and rash.   Allergic/Immunologic: Negative for environmental allergies and immunocompromised state.   Neurological:  Negative for tremors and headaches.   Hematological:  Negative for adenopathy. Does not bruise/bleed easily.   Psychiatric/Behavioral:  Negative for behavioral problems and confusion.      Physical Exam  Constitutional:       General: He is not in acute distress.     Appearance: He is well-developed.   Neck:      Vascular: No JVD.   Cardiovascular:      Rate and Rhythm: Normal rate and regular rhythm.   Pulmonary:      Effort: Pulmonary effort is normal. No respiratory distress.   Abdominal:      Palpations: Abdomen is soft.       Skin:     General: Skin is warm.   Neurological:      Mental Status: He is alert and oriented to person, place, and time.       Lab Results   Component Value Date    BUN 27 (H) 02/02/2024    CREATININE 2.2 (H) 02/02/2024     Lab Results   Component Value Date    WBC 5.62 02/02/2024    HGB 14.3 02/02/2024    HCT 43.9 02/02/2024     02/02/2024       Diagnostics:  The following  labs have been reviewed: CBC  CMP  The following radiology images have been independently reviewed and interpreted:     Assessment and Plan:  1. Kidney donor        German is doing well following living kidney donation.  He  is advised to refrain from heavy lifting for a period of two to four weeks from the date of surgery and then gradually resume normal activities. He is discharged from transplant surgical follow-up at this time. He should have an annual physician visit with routine laboratory and hypertension screening.    If he has continued pain/discomfort in his testicle after 2 more weeks, he is instructed to make us aware and further investigations may be done.    Patient advised that it is recommended that all transplant donors, and their close contacts and household members receive Covid vaccination.    Additional testing to be completed according to Clinical Practice Guideline for Living Kidney Donor Post-Donation Follow Up (LKD-07).    Interpretation of tests and discussion of patient management involves all members of the multidisciplinary transplant team.  I discussed the need for our program to be able to contact living donors for UNOS reporting purposes for a minimum of 2 years.  Failure of our center to be able to provide such information could jeopardize our ability to continue to offer living donor transplants.  German voices understanding and agrees to this follow-up.

## 2024-05-09 DIAGNOSIS — Z52.4 KIDNEY DONOR: Primary | ICD-10-CM

## 2024-07-22 ENCOUNTER — LAB VISIT (OUTPATIENT)
Dept: LAB | Facility: HOSPITAL | Age: 49
End: 2024-07-22
Attending: NURSE PRACTITIONER
Payer: COMMERCIAL

## 2024-07-22 DIAGNOSIS — Z12.5 ENCOUNTER FOR SCREENING FOR MALIGNANT NEOPLASM OF PROSTATE: ICD-10-CM

## 2024-07-22 DIAGNOSIS — Z00.01 ENCOUNTER FOR GENERAL ADULT MEDICAL EXAMINATION WITH ABNORMAL FINDINGS: Primary | ICD-10-CM

## 2024-07-22 DIAGNOSIS — E78.5 HYPERLIPIDEMIA: ICD-10-CM

## 2024-07-22 DIAGNOSIS — Z13.1 ENCOUNTER FOR SCREENING FOR DIABETES MELLITUS: ICD-10-CM

## 2024-07-22 LAB
ALBUMIN SERPL BCP-MCNC: 4.5 G/DL (ref 3.5–5.2)
ALP SERPL-CCNC: 65 U/L (ref 38–126)
ALT SERPL W/O P-5'-P-CCNC: 29 U/L (ref 10–44)
ANION GAP SERPL CALC-SCNC: 10 MMOL/L (ref 8–16)
AST SERPL-CCNC: 36 U/L (ref 15–46)
BASOPHILS # BLD AUTO: 0.02 K/UL (ref 0–0.2)
BASOPHILS NFR BLD: 0.5 % (ref 0–1.9)
BILIRUB SERPL-MCNC: 0.3 MG/DL (ref 0.1–1)
BILIRUB UR QL STRIP: NEGATIVE
CALCIUM SERPL-MCNC: 9.4 MG/DL (ref 8.7–10.5)
CHLORIDE SERPL-SCNC: 102 MMOL/L (ref 95–110)
CHOLEST SERPL-MCNC: 244 MG/DL (ref 120–199)
CHOLEST/HDLC SERPL: 4.5 {RATIO} (ref 2–5)
CLARITY UR REFRACT.AUTO: CLEAR
CO2 SERPL-SCNC: 28 MMOL/L (ref 23–29)
COLOR UR AUTO: YELLOW
CREAT SERPL-MCNC: 1.91 MG/DL (ref 0.5–1.4)
DIFFERENTIAL METHOD BLD: ABNORMAL
EOSINOPHIL # BLD AUTO: 0.1 K/UL (ref 0–0.5)
EOSINOPHIL NFR BLD: 3.3 % (ref 0–8)
ERYTHROCYTE [DISTWIDTH] IN BLOOD BY AUTOMATED COUNT: 12.2 % (ref 11.5–14.5)
EST. GFR  (NO RACE VARIABLE): 41.9 ML/MIN/1.73 M^2
ESTIMATED AVG GLUCOSE: 88 MG/DL (ref 68–131)
GLUCOSE SERPL-MCNC: 99 MG/DL (ref 70–110)
GLUCOSE UR QL STRIP: NEGATIVE
HBA1C MFR BLD: 4.7 % (ref 4–5.6)
HCT VFR BLD AUTO: 39.3 % (ref 40–54)
HDLC SERPL-MCNC: 54 MG/DL (ref 40–75)
HDLC SERPL: 22.1 % (ref 20–50)
HGB BLD-MCNC: 12.6 G/DL (ref 14–18)
HGB UR QL STRIP: ABNORMAL
IMM GRANULOCYTES # BLD AUTO: 0.02 K/UL (ref 0–0.04)
IMM GRANULOCYTES NFR BLD AUTO: 0.5 % (ref 0–0.5)
KETONES UR QL STRIP: NEGATIVE
LDLC SERPL CALC-MCNC: 172.4 MG/DL (ref 63–159)
LEUKOCYTE ESTERASE UR QL STRIP: NEGATIVE
LYMPHOCYTES # BLD AUTO: 1.9 K/UL (ref 1–4.8)
LYMPHOCYTES NFR BLD: 47.2 % (ref 18–48)
MCH RBC QN AUTO: 30.6 PG (ref 27–31)
MCHC RBC AUTO-ENTMCNC: 32.1 G/DL (ref 32–36)
MCV RBC AUTO: 95 FL (ref 82–98)
MONOCYTES # BLD AUTO: 0.5 K/UL (ref 0.3–1)
MONOCYTES NFR BLD: 11.8 % (ref 4–15)
NEUTROPHILS # BLD AUTO: 1.5 K/UL (ref 1.8–7.7)
NEUTROPHILS NFR BLD: 36.7 % (ref 38–73)
NITRITE UR QL STRIP: NEGATIVE
NONHDLC SERPL-MCNC: 190 MG/DL
NRBC BLD-RTO: 0 /100 WBC
PH UR STRIP: 6 [PH] (ref 5–8)
PLATELET # BLD AUTO: 271 K/UL (ref 150–450)
PMV BLD AUTO: 9.5 FL (ref 9.2–12.9)
POTASSIUM SERPL-SCNC: 5 MMOL/L (ref 3.5–5.1)
PROSTATE SPECIFIC ANTIGEN, TOTAL: 0.5 NG/ML (ref 0–4)
PROT SERPL-MCNC: 8 G/DL (ref 6–8.4)
PROT UR QL STRIP: NEGATIVE
PSA FREE MFR SERPL: 34 %
PSA FREE SERPL-MCNC: 0.17 NG/ML (ref 0–1.5)
RBC # BLD AUTO: 4.12 M/UL (ref 4.6–6.2)
SODIUM SERPL-SCNC: 140 MMOL/L (ref 136–145)
SP GR UR STRIP: 1.02 (ref 1–1.03)
TRIGL SERPL-MCNC: 88 MG/DL (ref 30–150)
URN SPEC COLLECT METH UR: ABNORMAL
UROBILINOGEN UR STRIP-ACNC: NEGATIVE EU/DL
UUN UR-MCNC: 21 MG/DL (ref 2–20)
WBC # BLD AUTO: 3.98 K/UL (ref 3.9–12.7)

## 2024-07-22 PROCEDURE — 80053 COMPREHEN METABOLIC PANEL: CPT | Mod: PN | Performed by: NURSE PRACTITIONER

## 2024-07-22 PROCEDURE — 85025 COMPLETE CBC W/AUTO DIFF WBC: CPT | Mod: PN | Performed by: NURSE PRACTITIONER

## 2024-07-22 PROCEDURE — 81003 URINALYSIS AUTO W/O SCOPE: CPT | Mod: 91,PN | Performed by: NURSE PRACTITIONER

## 2024-07-22 PROCEDURE — 84154 ASSAY OF PSA FREE: CPT | Mod: PN | Performed by: NURSE PRACTITIONER

## 2024-07-22 PROCEDURE — 80061 LIPID PANEL: CPT | Performed by: NURSE PRACTITIONER

## 2024-07-22 PROCEDURE — 83036 HEMOGLOBIN GLYCOSYLATED A1C: CPT | Performed by: NURSE PRACTITIONER

## 2024-07-23 ENCOUNTER — TELEPHONE (OUTPATIENT)
Dept: TRANSPLANT | Facility: CLINIC | Age: 49
End: 2024-07-23
Payer: COMMERCIAL

## 2024-07-24 ENCOUNTER — TELEPHONE (OUTPATIENT)
Dept: TRANSPLANT | Facility: CLINIC | Age: 49
End: 2024-07-24
Payer: COMMERCIAL

## 2024-07-24 NOTE — TELEPHONE ENCOUNTER
German Schmid    7/24/2024    [id number]    Description: male 1975    Attempt at contact: 1st    Follow-up Period:6 month    Physical Capacity: No limitations    Working for income: Yes. Working full-time    Loss of medical insurance due to donation: No    Current weight: 191 lbs.     Date of weight measurement: 7/15/2024    Any ER visits since last contact: No    Any hospitalizations since last contact: No    Any medical issues since last contact: No    Dialysis required: No    Diabetes: No    Any kidney complications: No    Notes: Patient told to continue to drink water d/t higher creatinine level.  Patient has appointment with primary care doctor coming up on 8/1/2024 and will give up a blood pressure afterwards.

## 2024-11-18 DIAGNOSIS — Z52.4 KIDNEY DONOR: Primary | ICD-10-CM

## 2025-02-11 ENCOUNTER — LAB VISIT (OUTPATIENT)
Dept: LAB | Facility: HOSPITAL | Age: 50
End: 2025-02-11
Attending: NURSE PRACTITIONER
Payer: COMMERCIAL

## 2025-02-11 DIAGNOSIS — Z52.4 KIDNEY DONOR: ICD-10-CM

## 2025-02-11 LAB
ALBUMIN SERPL BCP-MCNC: 4.5 G/DL (ref 3.5–5.2)
ANION GAP SERPL CALC-SCNC: 6 MMOL/L (ref 8–16)
BILIRUB UR QL STRIP: NEGATIVE
CALCIUM SERPL-MCNC: 9.3 MG/DL (ref 8.7–10.5)
CHLORIDE SERPL-SCNC: 103 MMOL/L (ref 95–110)
CLARITY UR REFRACT.AUTO: CLEAR
CO2 SERPL-SCNC: 28 MMOL/L (ref 23–29)
COLOR UR AUTO: YELLOW
CREAT SERPL-MCNC: 1.99 MG/DL (ref 0.5–1.4)
EST. GFR  (NO RACE VARIABLE): 39.7 ML/MIN/1.73 M^2
GLUCOSE SERPL-MCNC: 91 MG/DL (ref 70–110)
GLUCOSE UR QL STRIP: NEGATIVE
HGB UR QL STRIP: NEGATIVE
KETONES UR QL STRIP: NEGATIVE
LEUKOCYTE ESTERASE UR QL STRIP: NEGATIVE
NITRITE UR QL STRIP: NEGATIVE
PH UR STRIP: 6 [PH] (ref 5–8)
PHOSPHATE SERPL-MCNC: 4 MG/DL (ref 2.7–4.5)
POTASSIUM SERPL-SCNC: 4.6 MMOL/L (ref 3.5–5.1)
PROT UR QL STRIP: NEGATIVE
SODIUM SERPL-SCNC: 137 MMOL/L (ref 136–145)
SP GR UR STRIP: 1.02 (ref 1–1.03)
URN SPEC COLLECT METH UR: NORMAL
UROBILINOGEN UR STRIP-ACNC: NEGATIVE EU/DL
UUN UR-MCNC: 28 MG/DL (ref 2–20)

## 2025-02-11 PROCEDURE — 36415 COLL VENOUS BLD VENIPUNCTURE: CPT | Mod: PN | Performed by: NURSE PRACTITIONER

## 2025-02-11 PROCEDURE — 80069 RENAL FUNCTION PANEL: CPT | Mod: PN | Performed by: NURSE PRACTITIONER

## 2025-02-11 PROCEDURE — 81003 URINALYSIS AUTO W/O SCOPE: CPT | Mod: PN | Performed by: NURSE PRACTITIONER

## 2025-02-18 ENCOUNTER — TELEPHONE (OUTPATIENT)
Dept: TRANSPLANT | Facility: CLINIC | Age: 50
End: 2025-02-18
Payer: COMMERCIAL

## 2025-02-18 NOTE — TELEPHONE ENCOUNTER
German Sharmaine    2/18/2025    [id number]    Description: male 1975    Attempt at contact: 1st    Follow-up Period:1 year    Physical Capacity: No limitations    Working for income: Yes. Working full-time    Loss of medical insurance due to donation: No    Current weight: 185 lbs.     Date of weight measurement: 2/15/2025    Any ER visits since last contact: No    Any hospitalizations since last contact: No    Any medical issues since last contact: No    Dialysis required: No    Diabetes: No    Any kidney complications: No    Notes: Patient took a blood pressure independently, showed a blood pressure of 134/88.  Advised patient to make sure he starts seeing a primary care doctor to follow his kidney function.  Also let him know that he needs to drink more water, as he admits he doesn't drink much.  All questions answered and labs reviewed.

## 2025-06-12 ENCOUNTER — PATIENT OUTREACH (OUTPATIENT)
Dept: ADMINISTRATIVE | Facility: HOSPITAL | Age: 50
End: 2025-06-12
Payer: COMMERCIAL

## (undated) DEVICE — Device

## (undated) DEVICE — HEMOSTAT SURGICEL NU-KNIT 6X9

## (undated) DEVICE — PAD CURAD NONADH 3X4IN

## (undated) DEVICE — SUT PROLENE 6-0 BV-1 30IN

## (undated) DEVICE — CLIP HEMO-LOK ML

## (undated) DEVICE — DRAPE COLUMN DAVINCI XI

## (undated) DEVICE — TROCAR ENDOPATH XCEL 12X100MM

## (undated) DEVICE — ADHESIVE DERMABOND ADVANCED

## (undated) DEVICE — TRAY MINOR GEN SURG OMC

## (undated) DEVICE — DRAPE ARM DAVINCI XI

## (undated) DEVICE — SOL NS 1000CC

## (undated) DEVICE — SEAL UNIVERSAL 5MM-8MM XI

## (undated) DEVICE — SUT 3-0 12-18IN SILK

## (undated) DEVICE — PATCH SEALANT EVARREST 2X4

## (undated) DEVICE — SUT MCRYL PLUS 4-0 PS2 27IN

## (undated) DEVICE — SUT 1 36IN PDS II VIO MONO

## (undated) DEVICE — TIP YANKAUERS BULB NO VENT

## (undated) DEVICE — SET IRR URLGY 2LINE UNIV SPIKE

## (undated) DEVICE — DRAPE SCOPE PILLOW WARMER

## (undated) DEVICE — SUT 2-0 12-18IN SILK

## (undated) DEVICE — BLADE SURG CARBON STEEL SZ11

## (undated) DEVICE — TROCAR SPACEMAKER BLUNT 12MM

## (undated) DEVICE — SUT 4-0 12-18IN SILK BLACK

## (undated) DEVICE — TRAY CATH FOL SIL URIMTR 16FR

## (undated) DEVICE — ELECTRODE REM PLYHSV RETURN 9

## (undated) DEVICE — SYR 30CC LUER LOCK

## (undated) DEVICE — DRAPE ABDOMINAL TIBURON 14X11

## (undated) DEVICE — GOWN SURGICAL X-LARGE

## (undated) DEVICE — SUT EASE CROSSBOW CLSR SYS

## (undated) DEVICE — HOLDER TUBE

## (undated) DEVICE — PACK ECLIPSE SET-UP W/O DRAPE

## (undated) DEVICE — DRAPE SLUSH WARMER WITH DISC

## (undated) DEVICE — CLIP SPRING 6MM

## (undated) DEVICE — STAPLER INT LINEAR ARTC 3.5-45

## (undated) DEVICE — IRRIGATOR ENDOSCOPY DISP.

## (undated) DEVICE — TROCAR ENDOPATH XCEL 5X100MM

## (undated) DEVICE — DRAPE INCISE IOBAN 2 23X17IN

## (undated) DEVICE — SUT 2/0 30IN SILK BLK BRAI

## (undated) DEVICE — APPLIER CLIP ENDO MED/LG 10MM

## (undated) DEVICE — CART STAPLE RELD 45MM WHT

## (undated) DEVICE — KIT GELPORT LAPAROSCOPIC ABD

## (undated) DEVICE — DRAPE BAG ISOLATION 20 X 20

## (undated) DEVICE — NDL 18GA X1 1/2 REG BEVEL

## (undated) DEVICE — ELECTRODE BLADE INSULATED 1 IN